# Patient Record
Sex: FEMALE | Race: WHITE | NOT HISPANIC OR LATINO | URBAN - METROPOLITAN AREA
[De-identification: names, ages, dates, MRNs, and addresses within clinical notes are randomized per-mention and may not be internally consistent; named-entity substitution may affect disease eponyms.]

---

## 2017-11-30 ENCOUNTER — OUTPATIENT (OUTPATIENT)
Dept: OUTPATIENT SERVICES | Facility: HOSPITAL | Age: 62
LOS: 1 days | End: 2017-11-30
Payer: COMMERCIAL

## 2017-11-30 ENCOUNTER — TRANSCRIPTION ENCOUNTER (OUTPATIENT)
Age: 62
End: 2017-11-30

## 2017-11-30 ENCOUNTER — RESULT REVIEW (OUTPATIENT)
Age: 62
End: 2017-11-30

## 2017-11-30 DIAGNOSIS — Z90.81 ACQUIRED ABSENCE OF SPLEEN: Chronic | ICD-10-CM

## 2017-11-30 DIAGNOSIS — Z86.010 PERSONAL HISTORY OF COLONIC POLYPS: ICD-10-CM

## 2017-11-30 DIAGNOSIS — K21.0 GASTRO-ESOPHAGEAL REFLUX DISEASE WITH ESOPHAGITIS: ICD-10-CM

## 2017-11-30 DIAGNOSIS — Z90.710 ACQUIRED ABSENCE OF BOTH CERVIX AND UTERUS: Chronic | ICD-10-CM

## 2017-11-30 DIAGNOSIS — R13.10 DYSPHAGIA, UNSPECIFIED: ICD-10-CM

## 2017-11-30 PROCEDURE — 88305 TISSUE EXAM BY PATHOLOGIST: CPT | Mod: 26

## 2017-11-30 PROCEDURE — 88312 SPECIAL STAINS GROUP 1: CPT | Mod: 26

## 2017-11-30 PROCEDURE — 43239 EGD BIOPSY SINGLE/MULTIPLE: CPT

## 2017-11-30 PROCEDURE — 88305 TISSUE EXAM BY PATHOLOGIST: CPT

## 2017-11-30 PROCEDURE — 88312 SPECIAL STAINS GROUP 1: CPT

## 2017-11-30 PROCEDURE — G0105: CPT

## 2017-12-01 LAB — SURGICAL PATHOLOGY FINAL REPORT - CH: SIGNIFICANT CHANGE UP

## 2018-11-28 ENCOUNTER — TRANSCRIPTION ENCOUNTER (OUTPATIENT)
Age: 63
End: 2018-11-28

## 2018-11-29 ENCOUNTER — RESULT REVIEW (OUTPATIENT)
Age: 63
End: 2018-11-29

## 2018-11-29 ENCOUNTER — OUTPATIENT (OUTPATIENT)
Dept: OUTPATIENT SERVICES | Facility: HOSPITAL | Age: 63
LOS: 1 days | End: 2018-11-29
Payer: COMMERCIAL

## 2018-11-29 DIAGNOSIS — Z90.710 ACQUIRED ABSENCE OF BOTH CERVIX AND UTERUS: Chronic | ICD-10-CM

## 2018-11-29 DIAGNOSIS — R13.10 DYSPHAGIA, UNSPECIFIED: ICD-10-CM

## 2018-11-29 DIAGNOSIS — Z90.81 ACQUIRED ABSENCE OF SPLEEN: Chronic | ICD-10-CM

## 2018-11-29 DIAGNOSIS — K21.0 GASTRO-ESOPHAGEAL REFLUX DISEASE WITH ESOPHAGITIS: ICD-10-CM

## 2018-11-29 PROCEDURE — 88313 SPECIAL STAINS GROUP 2: CPT

## 2018-11-29 PROCEDURE — 88305 TISSUE EXAM BY PATHOLOGIST: CPT | Mod: 26

## 2018-11-29 PROCEDURE — 88312 SPECIAL STAINS GROUP 1: CPT

## 2018-11-29 PROCEDURE — 88305 TISSUE EXAM BY PATHOLOGIST: CPT

## 2018-11-29 PROCEDURE — 88313 SPECIAL STAINS GROUP 2: CPT | Mod: 26

## 2018-11-29 PROCEDURE — 43239 EGD BIOPSY SINGLE/MULTIPLE: CPT

## 2018-11-29 PROCEDURE — 88312 SPECIAL STAINS GROUP 1: CPT | Mod: 26

## 2018-11-30 LAB — SURGICAL PATHOLOGY FINAL REPORT - CH: SIGNIFICANT CHANGE UP

## 2019-09-30 ENCOUNTER — OUTPATIENT (OUTPATIENT)
Dept: OUTPATIENT SERVICES | Facility: HOSPITAL | Age: 64
LOS: 1 days | End: 2019-09-30
Payer: MEDICARE

## 2019-09-30 VITALS
DIASTOLIC BLOOD PRESSURE: 76 MMHG | WEIGHT: 238.1 LBS | RESPIRATION RATE: 14 BRPM | TEMPERATURE: 98 F | HEIGHT: 61 IN | SYSTOLIC BLOOD PRESSURE: 115 MMHG | HEART RATE: 101 BPM

## 2019-09-30 DIAGNOSIS — Z98.890 OTHER SPECIFIED POSTPROCEDURAL STATES: Chronic | ICD-10-CM

## 2019-09-30 DIAGNOSIS — M19.90 UNSPECIFIED OSTEOARTHRITIS, UNSPECIFIED SITE: ICD-10-CM

## 2019-09-30 DIAGNOSIS — Z90.81 ACQUIRED ABSENCE OF SPLEEN: Chronic | ICD-10-CM

## 2019-09-30 DIAGNOSIS — Z90.710 ACQUIRED ABSENCE OF BOTH CERVIX AND UTERUS: Chronic | ICD-10-CM

## 2019-09-30 DIAGNOSIS — M19.072 PRIMARY OSTEOARTHRITIS, LEFT ANKLE AND FOOT: ICD-10-CM

## 2019-09-30 DIAGNOSIS — Z01.818 ENCOUNTER FOR OTHER PREPROCEDURAL EXAMINATION: ICD-10-CM

## 2019-09-30 LAB
ANION GAP SERPL CALC-SCNC: 7 MMOL/L — SIGNIFICANT CHANGE UP (ref 5–17)
BUN SERPL-MCNC: 27 MG/DL — HIGH (ref 7–23)
CALCIUM SERPL-MCNC: 9.9 MG/DL — SIGNIFICANT CHANGE UP (ref 8.4–10.5)
CHLORIDE SERPL-SCNC: 101 MMOL/L — SIGNIFICANT CHANGE UP (ref 96–108)
CO2 SERPL-SCNC: 30 MMOL/L — SIGNIFICANT CHANGE UP (ref 22–31)
CREAT SERPL-MCNC: 1.31 MG/DL — HIGH (ref 0.5–1.3)
GLUCOSE SERPL-MCNC: 135 MG/DL — HIGH (ref 70–99)
HBA1C BLD-MCNC: 7.4 % — HIGH (ref 4–5.6)
HCT VFR BLD CALC: 39.7 % — SIGNIFICANT CHANGE UP (ref 34.5–45)
HGB BLD-MCNC: 11.9 G/DL — SIGNIFICANT CHANGE UP (ref 11.5–15.5)
MCHC RBC-ENTMCNC: 26 PG — LOW (ref 27–34)
MCHC RBC-ENTMCNC: 30 GM/DL — LOW (ref 32–36)
MCV RBC AUTO: 86.7 FL — SIGNIFICANT CHANGE UP (ref 80–100)
NRBC # BLD: 0 /100 WBCS — SIGNIFICANT CHANGE UP (ref 0–0)
PLATELET # BLD AUTO: 537 K/UL — HIGH (ref 150–400)
POTASSIUM SERPL-MCNC: 4.3 MMOL/L — SIGNIFICANT CHANGE UP (ref 3.5–5.3)
POTASSIUM SERPL-SCNC: 4.3 MMOL/L — SIGNIFICANT CHANGE UP (ref 3.5–5.3)
RBC # BLD: 4.58 M/UL — SIGNIFICANT CHANGE UP (ref 3.8–5.2)
RBC # FLD: 18.7 % — HIGH (ref 10.3–14.5)
SODIUM SERPL-SCNC: 138 MMOL/L — SIGNIFICANT CHANGE UP (ref 135–145)
TSH SERPL-MCNC: 2.52 UIU/ML — SIGNIFICANT CHANGE UP (ref 0.27–4.2)
WBC # BLD: 11.44 K/UL — HIGH (ref 3.8–10.5)
WBC # FLD AUTO: 11.44 K/UL — HIGH (ref 3.8–10.5)

## 2019-09-30 PROCEDURE — 85027 COMPLETE CBC AUTOMATED: CPT

## 2019-09-30 PROCEDURE — 36415 COLL VENOUS BLD VENIPUNCTURE: CPT

## 2019-09-30 PROCEDURE — 80048 BASIC METABOLIC PNL TOTAL CA: CPT

## 2019-09-30 PROCEDURE — G0463: CPT

## 2019-09-30 PROCEDURE — 93005 ELECTROCARDIOGRAM TRACING: CPT

## 2019-09-30 PROCEDURE — 93010 ELECTROCARDIOGRAM REPORT: CPT

## 2019-09-30 PROCEDURE — 84443 ASSAY THYROID STIM HORMONE: CPT

## 2019-09-30 PROCEDURE — 83036 HEMOGLOBIN GLYCOSYLATED A1C: CPT

## 2019-09-30 NOTE — H&P PST ADULT - NSICDXPROBLEM_GEN_ALL_CORE_FT
PROBLEM DIAGNOSES  Problem: Arthritis  Assessment and Plan: Left triple  arthrodesis achilles tendon lengthening   Medical and cardiac clearance   Pre op instructions

## 2019-09-30 NOTE — H&P PST ADULT - NSICDXFAMILYHX_GEN_ALL_CORE_FT
FAMILY HISTORY:  FH: HTN (hypertension), sibilings    Father  Still living? No  Family history of cancer, Age at diagnosis: Age Unknown    Mother  Still living? No  Family history of diabetes mellitus, Age at diagnosis: Age Unknown

## 2019-09-30 NOTE — H&P PST ADULT - MUSCULOSKELETAL COMMENTS
left ankle swelling/pain decreased ROM: tender on palpation sciatica pain x1 month radiates to right leg- currently having PT, Left ankle pain/swelling

## 2019-09-30 NOTE — H&P PST ADULT - HISTORY OF PRESENT ILLNESS
63 yo female presents with progressively worsening left ankle pain/swelling for past 5 years related to torn ligament after fall injury 50 years ago without intervention. Pt reports aching intermittent left ankle with pain scale of 10/10 and decreased ROM. Report pain is affecting quality of life and activities. Use of injections/Aleve and brace without effect. Scheduled for left triple arthrodesis.

## 2019-09-30 NOTE — H&P PST ADULT - MUSCULOSKELETAL
detailed exam decreased ROM due to pain/diminished strength/joint swelling/no calf tenderness details…

## 2019-09-30 NOTE — H&P PST ADULT - NSICDXPASTMEDICALHX_GEN_ALL_CORE_FT
PAST MEDICAL HISTORY:  DM (diabetes mellitus), type 2     Dyslipidemia     H/O: Hypothyroidism s/p thyroidectomy 2008    Hodgkin's Disease 1987 treated with radiation 9 weeks    HTN (Hypertension)     Iron deficiency anemia Iron tablets 2 x day started 1 month ago    MVP (mitral valve prolapse)     platus fascitis     Thyroid cancer s/p thyroidectomy 2006

## 2019-09-30 NOTE — H&P PST ADULT - NSANTHOSAYNRD_GEN_A_CORE
No. NICOLAS screening performed.  STOP BANG Legend: 0-2 = LOW Risk; 3-4 = INTERMEDIATE Risk; 5-8 = HIGH Risk

## 2019-09-30 NOTE — H&P PST ADULT - NSICDXPASTSURGICALHX_GEN_ALL_CORE_FT
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

## 2019-10-17 ENCOUNTER — TRANSCRIPTION ENCOUNTER (OUTPATIENT)
Age: 64
End: 2019-10-17

## 2019-10-18 ENCOUNTER — RESULT REVIEW (OUTPATIENT)
Age: 64
End: 2019-10-18

## 2019-10-18 ENCOUNTER — INPATIENT (INPATIENT)
Facility: HOSPITAL | Age: 64
LOS: 2 days | Discharge: NURSING, REHAB OR LONG-TERM CARE | DRG: 494 | End: 2019-10-21
Attending: ORTHOPAEDIC SURGERY | Admitting: ORTHOPAEDIC SURGERY
Payer: MEDICARE

## 2019-10-18 VITALS
HEART RATE: 106 BPM | SYSTOLIC BLOOD PRESSURE: 163 MMHG | WEIGHT: 236.78 LBS | OXYGEN SATURATION: 98 % | DIASTOLIC BLOOD PRESSURE: 86 MMHG | TEMPERATURE: 98 F | RESPIRATION RATE: 16 BRPM | HEIGHT: 61 IN

## 2019-10-18 DIAGNOSIS — Z90.81 ACQUIRED ABSENCE OF SPLEEN: Chronic | ICD-10-CM

## 2019-10-18 DIAGNOSIS — M19.072 PRIMARY OSTEOARTHRITIS, LEFT ANKLE AND FOOT: ICD-10-CM

## 2019-10-18 DIAGNOSIS — Z98.890 OTHER SPECIFIED POSTPROCEDURAL STATES: Chronic | ICD-10-CM

## 2019-10-18 DIAGNOSIS — Z90.710 ACQUIRED ABSENCE OF BOTH CERVIX AND UTERUS: Chronic | ICD-10-CM

## 2019-10-18 LAB
ANION GAP SERPL CALC-SCNC: 8 MMOL/L — SIGNIFICANT CHANGE UP (ref 5–17)
BASOPHILS # BLD AUTO: 0.04 K/UL — SIGNIFICANT CHANGE UP (ref 0–0.2)
BASOPHILS NFR BLD AUTO: 0.2 % — SIGNIFICANT CHANGE UP (ref 0–2)
BUN SERPL-MCNC: 26 MG/DL — HIGH (ref 7–23)
CALCIUM SERPL-MCNC: 8.9 MG/DL — SIGNIFICANT CHANGE UP (ref 8.4–10.5)
CHLORIDE SERPL-SCNC: 101 MMOL/L — SIGNIFICANT CHANGE UP (ref 96–108)
CO2 SERPL-SCNC: 26 MMOL/L — SIGNIFICANT CHANGE UP (ref 22–31)
CREAT SERPL-MCNC: 1.19 MG/DL — SIGNIFICANT CHANGE UP (ref 0.5–1.3)
EOSINOPHIL # BLD AUTO: 0 K/UL — SIGNIFICANT CHANGE UP (ref 0–0.5)
EOSINOPHIL NFR BLD AUTO: 0 % — SIGNIFICANT CHANGE UP (ref 0–6)
GLUCOSE SERPL-MCNC: 209 MG/DL — HIGH (ref 70–99)
HCT VFR BLD CALC: 35.3 % — SIGNIFICANT CHANGE UP (ref 34.5–45)
HGB BLD-MCNC: 10.7 G/DL — LOW (ref 11.5–15.5)
IMM GRANULOCYTES NFR BLD AUTO: 0.4 % — SIGNIFICANT CHANGE UP (ref 0–1.5)
LYMPHOCYTES # BLD AUTO: 0.89 K/UL — LOW (ref 1–3.3)
LYMPHOCYTES # BLD AUTO: 4.8 % — LOW (ref 13–44)
MCHC RBC-ENTMCNC: 26.2 PG — LOW (ref 27–34)
MCHC RBC-ENTMCNC: 30.3 GM/DL — LOW (ref 32–36)
MCV RBC AUTO: 86.5 FL — SIGNIFICANT CHANGE UP (ref 80–100)
MONOCYTES # BLD AUTO: 0.69 K/UL — SIGNIFICANT CHANGE UP (ref 0–0.9)
MONOCYTES NFR BLD AUTO: 3.7 % — SIGNIFICANT CHANGE UP (ref 2–14)
NEUTROPHILS # BLD AUTO: 16.89 K/UL — HIGH (ref 1.8–7.4)
NEUTROPHILS NFR BLD AUTO: 90.9 % — HIGH (ref 43–77)
NRBC # BLD: 0 /100 WBCS — SIGNIFICANT CHANGE UP (ref 0–0)
PLATELET # BLD AUTO: 461 K/UL — HIGH (ref 150–400)
POTASSIUM SERPL-MCNC: 4.4 MMOL/L — SIGNIFICANT CHANGE UP (ref 3.5–5.3)
POTASSIUM SERPL-SCNC: 4.4 MMOL/L — SIGNIFICANT CHANGE UP (ref 3.5–5.3)
RBC # BLD: 4.08 M/UL — SIGNIFICANT CHANGE UP (ref 3.8–5.2)
RBC # FLD: 18.1 % — HIGH (ref 10.3–14.5)
SODIUM SERPL-SCNC: 135 MMOL/L — SIGNIFICANT CHANGE UP (ref 135–145)
WBC # BLD: 18.59 K/UL — HIGH (ref 3.8–10.5)
WBC # FLD AUTO: 18.59 K/UL — HIGH (ref 3.8–10.5)

## 2019-10-18 PROCEDURE — 88304 TISSUE EXAM BY PATHOLOGIST: CPT | Mod: 26

## 2019-10-18 PROCEDURE — 99223 1ST HOSP IP/OBS HIGH 75: CPT

## 2019-10-18 RX ORDER — CEFAZOLIN SODIUM 1 G
2000 VIAL (EA) INJECTION ONCE
Refills: 0 | Status: DISCONTINUED | OUTPATIENT
Start: 2019-10-18 | End: 2019-10-21

## 2019-10-18 RX ORDER — LEVOTHYROXINE SODIUM 125 MCG
125 TABLET ORAL DAILY
Refills: 0 | Status: DISCONTINUED | OUTPATIENT
Start: 2019-10-18 | End: 2019-10-21

## 2019-10-18 RX ORDER — ONDANSETRON 8 MG/1
4 TABLET, FILM COATED ORAL EVERY 6 HOURS
Refills: 0 | Status: DISCONTINUED | OUTPATIENT
Start: 2019-10-18 | End: 2019-10-21

## 2019-10-18 RX ORDER — LEVOTHYROXINE SODIUM 125 MCG
1 TABLET ORAL
Qty: 0 | Refills: 0 | DISCHARGE

## 2019-10-18 RX ORDER — VALSARTAN 80 MG/1
1 TABLET ORAL
Qty: 0 | Refills: 0 | DISCHARGE

## 2019-10-18 RX ORDER — METFORMIN HYDROCHLORIDE 850 MG/1
500 TABLET ORAL
Refills: 0 | Status: DISCONTINUED | OUTPATIENT
Start: 2019-10-19 | End: 2019-10-21

## 2019-10-18 RX ORDER — ACETAMINOPHEN 500 MG
1000 TABLET ORAL EVERY 8 HOURS
Refills: 0 | Status: DISCONTINUED | OUTPATIENT
Start: 2019-10-19 | End: 2019-10-21

## 2019-10-18 RX ORDER — OXYCODONE HYDROCHLORIDE 5 MG/1
5 TABLET ORAL ONCE
Refills: 0 | Status: DISCONTINUED | OUTPATIENT
Start: 2019-10-18 | End: 2019-10-18

## 2019-10-18 RX ORDER — ATORVASTATIN CALCIUM 80 MG/1
20 TABLET, FILM COATED ORAL AT BEDTIME
Refills: 0 | Status: DISCONTINUED | OUTPATIENT
Start: 2019-10-18 | End: 2019-10-21

## 2019-10-18 RX ORDER — INSULIN LISPRO 100/ML
VIAL (ML) SUBCUTANEOUS
Refills: 0 | Status: DISCONTINUED | OUTPATIENT
Start: 2019-10-18 | End: 2019-10-21

## 2019-10-18 RX ORDER — DEXTROSE 50 % IN WATER 50 %
25 SYRINGE (ML) INTRAVENOUS ONCE
Refills: 0 | Status: DISCONTINUED | OUTPATIENT
Start: 2019-10-18 | End: 2019-10-21

## 2019-10-18 RX ORDER — HYDROMORPHONE HYDROCHLORIDE 2 MG/ML
0.5 INJECTION INTRAMUSCULAR; INTRAVENOUS; SUBCUTANEOUS
Refills: 0 | Status: DISCONTINUED | OUTPATIENT
Start: 2019-10-18 | End: 2019-10-18

## 2019-10-18 RX ORDER — FERROUS SULFATE 325(65) MG
1 TABLET ORAL
Qty: 0 | Refills: 0 | DISCHARGE

## 2019-10-18 RX ORDER — SODIUM CHLORIDE 9 MG/ML
1000 INJECTION, SOLUTION INTRAVENOUS
Refills: 0 | Status: DISCONTINUED | OUTPATIENT
Start: 2019-10-18 | End: 2019-10-20

## 2019-10-18 RX ORDER — SODIUM CHLORIDE 9 MG/ML
1000 INJECTION, SOLUTION INTRAVENOUS
Refills: 0 | Status: DISCONTINUED | OUTPATIENT
Start: 2019-10-18 | End: 2019-10-18

## 2019-10-18 RX ORDER — DOCUSATE SODIUM 100 MG
100 CAPSULE ORAL THREE TIMES A DAY
Refills: 0 | Status: DISCONTINUED | OUTPATIENT
Start: 2019-10-18 | End: 2019-10-21

## 2019-10-18 RX ORDER — ASPIRIN/CALCIUM CARB/MAGNESIUM 324 MG
325 TABLET ORAL DAILY
Refills: 0 | Status: DISCONTINUED | OUTPATIENT
Start: 2019-10-18 | End: 2019-10-21

## 2019-10-18 RX ORDER — GLIMEPIRIDE 1 MG
0 TABLET ORAL
Qty: 0 | Refills: 0 | DISCHARGE

## 2019-10-18 RX ORDER — OXYCODONE HYDROCHLORIDE 5 MG/1
10 TABLET ORAL EVERY 4 HOURS
Refills: 0 | Status: DISCONTINUED | OUTPATIENT
Start: 2019-10-18 | End: 2019-10-19

## 2019-10-18 RX ORDER — DEXTROSE 50 % IN WATER 50 %
15 SYRINGE (ML) INTRAVENOUS ONCE
Refills: 0 | Status: DISCONTINUED | OUTPATIENT
Start: 2019-10-18 | End: 2019-10-21

## 2019-10-18 RX ORDER — CHLORHEXIDINE GLUCONATE 213 G/1000ML
1 SOLUTION TOPICAL ONCE
Refills: 0 | Status: COMPLETED | OUTPATIENT
Start: 2019-10-18 | End: 2019-10-18

## 2019-10-18 RX ORDER — HYDROMORPHONE HYDROCHLORIDE 2 MG/ML
0.5 INJECTION INTRAMUSCULAR; INTRAVENOUS; SUBCUTANEOUS EVERY 4 HOURS
Refills: 0 | Status: DISCONTINUED | OUTPATIENT
Start: 2019-10-18 | End: 2019-10-21

## 2019-10-18 RX ORDER — GLUCAGON INJECTION, SOLUTION 0.5 MG/.1ML
1 INJECTION, SOLUTION SUBCUTANEOUS ONCE
Refills: 0 | Status: DISCONTINUED | OUTPATIENT
Start: 2019-10-18 | End: 2019-10-21

## 2019-10-18 RX ORDER — INSULIN LISPRO 100/ML
VIAL (ML) SUBCUTANEOUS AT BEDTIME
Refills: 0 | Status: DISCONTINUED | OUTPATIENT
Start: 2019-10-18 | End: 2019-10-21

## 2019-10-18 RX ORDER — ATORVASTATIN CALCIUM 80 MG/1
1 TABLET, FILM COATED ORAL
Qty: 0 | Refills: 0 | DISCHARGE

## 2019-10-18 RX ORDER — DEXTROSE 50 % IN WATER 50 %
12.5 SYRINGE (ML) INTRAVENOUS ONCE
Refills: 0 | Status: DISCONTINUED | OUTPATIENT
Start: 2019-10-18 | End: 2019-10-21

## 2019-10-18 RX ORDER — ACETAMINOPHEN 500 MG
1000 TABLET ORAL EVERY 6 HOURS
Refills: 0 | Status: COMPLETED | OUTPATIENT
Start: 2019-10-18 | End: 2019-10-19

## 2019-10-18 RX ORDER — OXYCODONE HYDROCHLORIDE 5 MG/1
5 TABLET ORAL EVERY 4 HOURS
Refills: 0 | Status: DISCONTINUED | OUTPATIENT
Start: 2019-10-18 | End: 2019-10-19

## 2019-10-18 RX ORDER — METOPROLOL TARTRATE 50 MG
25 TABLET ORAL DAILY
Refills: 0 | Status: DISCONTINUED | OUTPATIENT
Start: 2019-10-18 | End: 2019-10-21

## 2019-10-18 RX ORDER — SODIUM CHLORIDE 9 MG/ML
1000 INJECTION, SOLUTION INTRAVENOUS
Refills: 0 | Status: DISCONTINUED | OUTPATIENT
Start: 2019-10-18 | End: 2019-10-21

## 2019-10-18 RX ORDER — CEFAZOLIN SODIUM 1 G
2000 VIAL (EA) INJECTION EVERY 8 HOURS
Refills: 0 | Status: COMPLETED | OUTPATIENT
Start: 2019-10-18 | End: 2019-10-19

## 2019-10-18 RX ORDER — VALSARTAN 80 MG/1
320 TABLET ORAL DAILY
Refills: 0 | Status: DISCONTINUED | OUTPATIENT
Start: 2019-10-20 | End: 2019-10-21

## 2019-10-18 RX ORDER — PANTOPRAZOLE SODIUM 20 MG/1
40 TABLET, DELAYED RELEASE ORAL
Refills: 0 | Status: DISCONTINUED | OUTPATIENT
Start: 2019-10-18 | End: 2019-10-21

## 2019-10-18 RX ORDER — ONDANSETRON 8 MG/1
4 TABLET, FILM COATED ORAL ONCE
Refills: 0 | Status: DISCONTINUED | OUTPATIENT
Start: 2019-10-18 | End: 2019-10-18

## 2019-10-18 RX ADMIN — CHLORHEXIDINE GLUCONATE 1 APPLICATION(S): 213 SOLUTION TOPICAL at 08:47

## 2019-10-18 RX ADMIN — Medication 100 MILLIGRAM(S): at 22:19

## 2019-10-18 RX ADMIN — SODIUM CHLORIDE 100 MILLILITER(S): 9 INJECTION, SOLUTION INTRAVENOUS at 14:35

## 2019-10-18 RX ADMIN — Medication 1000 MILLIGRAM(S): at 19:47

## 2019-10-18 RX ADMIN — Medication 400 MILLIGRAM(S): at 19:43

## 2019-10-18 RX ADMIN — ATORVASTATIN CALCIUM 20 MILLIGRAM(S): 80 TABLET, FILM COATED ORAL at 22:18

## 2019-10-18 RX ADMIN — Medication 1: at 17:33

## 2019-10-18 RX ADMIN — SODIUM CHLORIDE 125 MILLILITER(S): 9 INJECTION, SOLUTION INTRAVENOUS at 17:36

## 2019-10-18 NOTE — CONSULT NOTE ADULT - ASSESSMENT
POD#0 s/p LEFT Ankle Triple Arthrodesis with achilles lengthening  - Pain control  - Bowel regimen  - PT/OT  - VTE PPx - per Ortho    DM Type 2  - hold glimeperide  - resume metformin  - use sliding scale AC and HS    HTN  - continue metoprolol  - resume ARB on POD#@    HLD  - continue statin    Thyroid Ca s/p thyroidectomy  - continue levothyroxine POD#0 s/p LEFT Ankle Triple Arthrodesis with achilles lengthening  - Pain control  - Bowel regimen  - PT/OT  - VTE PPx - ASA 325mg x 6 weeks, then resume 81mg daily    Hx of CAD (cath in 2011, medically managed, related to prior radiation tx)  - continue Aspirin at higher dose for VTE PPx, then usual dose after 6 weeks  - continue beta-blocker, statin    DM Type 2  - hold glimeperide  - resume metformin  - use sliding scale AC and HS    HTN  - continue metoprolol  - resume ARB on POD#2    HLD  - continue statin    Thyroid Ca s/p thyroidectomy  - continue levothyroxine

## 2019-10-18 NOTE — CONSULT NOTE ADULT - SUBJECTIVE AND OBJECTIVE BOX
Information Obtained from:  EHR, Physical Chart, Patient at bedside (relevant EHR and Chart information verified with patient)    HPI:  65yo F admitted s/p triple arthrodesis with achilles tendon lengthening today.  Had progressively worsening left ankle pain/swelling for the past 5 years to torn ligament (suffered during fall approx 50ya).  Pain 10/10 with activity, decreased ROM, minimal relief with Aleve and brace.    REVIEW OF SYSTEMS:  CONSTITUTIONAL: No fever, weight loss, or fatigue  EYES: No eye pain, visual disturbances, or discharge  ENMT:  No difficulty hearing, tinnitus, vertigo; No sinus or throat pain  NECK: No pain or stiffness  BREASTS: No pain, masses, or nipple discharge  RESPIRATORY: No cough, wheezing, chills or hemoptysis; No shortness of breath  CARDIOVASCULAR: No chest pain, palpitations, dizziness, or leg swelling  GASTROINTESTINAL: No abdominal or epigastric pain. No nausea, vomiting, or hematemesis; No diarrhea or constipation. No melena or hematochezia.  GENITOURINARY: No dysuria, frequency, hematuria, or incontinence  NEUROLOGICAL: No headaches, memory loss, loss of strength, numbness, or tremors  SKIN: No itching, burning, rashes, or lesions   LYMPH NODES: No enlarged glands  ENDOCRINE: No heat or cold intolerance; No hair loss  MUSCULOSKELETAL: No muscle or back pain  PSYCHIATRIC: No depression, anxiety, mood swings, or difficulty sleeping  HEME/LYMPH: No easy bruising, or bleeding gums  ALLERGY AND IMMUNOLOGIC: No hives or eczema    PAST MEDICAL & SURGICAL HISTORY:  Thyroid cancer: s/p thyroidectomy 2006  Iron deficiency anemia: Iron tablets 2 x day started 1 month ago  MVP (mitral valve prolapse)  DM (diabetes mellitus), type 2  platus fascitis  Hodgkin's Disease: 1987 treated with radiation 9 weeks  HTN (Hypertension)  H/O: Hypothyroidism: s/p thyroidectomy 2008  Dyslipidemia  H/O hernia repair: hiatal 2016  S/P hysterectomy: 2012  H/O splenectomy: 1987- Sienna&#x27;s  S/P D&C: x5 for polyps  History of Thyroidectomy: 2006 d/t thyroid cancer- total removal    SOCIAL HISTORY:  Lives: spouse  Smoking Hx: none  ETOH Hx: none  Illicit Drug Use:  None    Allergies    latex (Rash)  No Known Drug Allergies    Intolerances     Home Medications:  Aspir 81 81 mg oral tablet: 1 tab(s) orally once a day (18 Oct 2019 08:05)  atorvastatin 20 mg oral tablet: 1 tab(s) orally once a day (18 Oct 2019 08:05)  ferrous sulfate 325 mg (65 mg elemental iron) oral tablet: 1 tab(s) orally 2 times a day (18 Oct 2019 08:05)  glimepiride 1 mg oral tablet: orally 2 times a day (18 Oct 2019 08:05)  levothyroxine 125 mcg (0.125 mg) oral tablet: 1 tab(s) orally once a day (18 Oct 2019 08:05)  metFORMIN 500 mg oral tablet:  orally 2 times a day (18 Oct 2019 08:05)  Metoprolol Succinate ER 25 mg oral tablet, extended release: 1 tab(s) orally once a day (18 Oct 2019 08:05)  pantoprazole 40 mg oral delayed release tablet: 1 tab(s) orally once a day (18 Oct 2019 08:05)  valsartan 320 mg oral tablet: 1 tab(s) orally once a day (18 Oct 2019 08:05)  vitamin D: 1000 unit(s)  once a day (18 Oct 2019 08:05)    FAMILY HISTORY:  FH: HTN (hypertension): sibilings  Family history of diabetes mellitus (Mother)  Family history of cancer (Father): kidney tumor    PHYSICAL EXAM:  Vital Signs Last 24 Hrs  T(C): 37.6 (18 Oct 2019 14:09), Max: 37.6 (18 Oct 2019 14:09)  T(F): 99.6 (18 Oct 2019 14:09), Max: 99.6 (18 Oct 2019 14:09)  HR: 104 (18 Oct 2019 14:54) (98 - 108)  BP: 125/63 (18 Oct 2019 14:54) (125/62 - 163/86)  BP(mean): --  RR: 20 (18 Oct 2019 14:54) (16 - 23)  SpO2: 93% (18 Oct 2019 14:54) (93% - 98%)    GENERAL: NAD, well-groomed, well-developed, awake, alert, oriented x 3, fluent and coherent speech  EYES: EOMI, PERRLA, conjunctiva and sclera clear  ENMT: No tonsillar erythema, exudates, or enlargement; Moist mucous membranes, Good dentition, No lesions  NECK: Supple, No JVD, No Cervical LAD, No thyromegaly, No thyroid nodules  NERVOUS SYSTEM:  Good concentration;  No facial droop  CHEST/LUNG: Clear to auscultation bilaterally; No rales, rhonchi, wheezing, or rubs  HEART: Regular rate and rhythm; No murmurs, rubs, or gallops  ABDOMEN: Soft, Nontender, Nondistended, Bowel sounds present, No palpable masses or organomegaly, No bruits  EXTREMITIES:  2+ Peripheral Pulses, No clubbing, cyanosis, or edema  INCISION:  Dressing clean/dry/intact Information Obtained from:  EHR, Physical Chart, Patient at bedside (relevant EHR and Chart information verified with patient)    HPI:  65yo F admitted s/p triple arthrodesis with achilles tendon lengthening today.  Had progressively worsening left ankle pain/swelling for the past 5 years to torn ligament (suffered during fall approx 50ya).  Pain 10/10 with activity, decreased ROM, minimal relief with Aleve and brace.    REVIEW OF SYSTEMS:  CONSTITUTIONAL: No fever, weight loss, or fatigue  EYES: No eye pain, visual disturbances, or discharge  ENMT:  No difficulty hearing, tinnitus, vertigo; No sinus or throat pain  NECK: No pain or stiffness  BREASTS: No pain, masses, or nipple discharge  RESPIRATORY: No cough, wheezing, chills or hemoptysis; No shortness of breath  CARDIOVASCULAR: No chest pain, palpitations, dizziness, or leg swelling  GASTROINTESTINAL: No abdominal or epigastric pain. No nausea, vomiting, or hematemesis; No diarrhea or constipation. No melena or hematochezia.  GENITOURINARY: No dysuria, frequency, hematuria, or incontinence  NEUROLOGICAL: No headaches, memory loss, loss of strength, numbness, or tremors  SKIN: No itching, burning, rashes, or lesions   LYMPH NODES: No enlarged glands  ENDOCRINE: No heat or cold intolerance; No hair loss  MUSCULOSKELETAL: No muscle or back pain  PSYCHIATRIC: No depression, anxiety, mood swings, or difficulty sleeping  HEME/LYMPH: No easy bruising, or bleeding gums  ALLERGY AND IMMUNOLOGIC: No hives or eczema    PAST MEDICAL & SURGICAL HISTORY:  Thyroid cancer: s/p thyroidectomy 2006  Iron deficiency anemia: Iron tablets 2 x day started 1 month ago  MVP (mitral valve prolapse)  DM (diabetes mellitus), type 2  platus fascitis  Hodgkin's Disease: 1987 treated with radiation 9 weeks  HTN (Hypertension)  H/O: Hypothyroidism: s/p thyroidectomy 2008  Dyslipidemia  H/O hernia repair: hiatal 2016  S/P hysterectomy: 2012  H/O splenectomy: 1987- Sienna&#x27;s  S/P D&C: x5 for polyps  History of Thyroidectomy: 2006 d/t thyroid cancer- total removal    SOCIAL HISTORY:  Lives: spouse  Smoking Hx: none  ETOH Hx: none  Illicit Drug Use:  None    Allergies    latex (Rash)  No Known Drug Allergies    Intolerances     Home Medications:  Aspir 81 81 mg oral tablet: 1 tab(s) orally once a day (18 Oct 2019 08:05)  atorvastatin 20 mg oral tablet: 1 tab(s) orally once a day (18 Oct 2019 08:05)  ferrous sulfate 325 mg (65 mg elemental iron) oral tablet: 1 tab(s) orally 2 times a day (18 Oct 2019 08:05)  glimepiride 1 mg oral tablet: orally 2 times a day (18 Oct 2019 08:05)  levothyroxine 125 mcg (0.125 mg) oral tablet: 1 tab(s) orally once a day (18 Oct 2019 08:05)  metFORMIN 500 mg oral tablet:  orally 2 times a day (18 Oct 2019 08:05)  Metoprolol Succinate ER 25 mg oral tablet, extended release: 1 tab(s) orally once a day (18 Oct 2019 08:05)  pantoprazole 40 mg oral delayed release tablet: 1 tab(s) orally once a day (18 Oct 2019 08:05)  valsartan 320 mg oral tablet: 1 tab(s) orally once a day (18 Oct 2019 08:05)  vitamin D: 1000 unit(s)  once a day (18 Oct 2019 08:05)    FAMILY HISTORY:  FH: HTN (hypertension): sibilings  Family history of diabetes mellitus (Mother)  Family history of cancer (Father): kidney tumor    PHYSICAL EXAM:  Vital Signs Last 24 Hrs  T(C): 37.6 (18 Oct 2019 14:09), Max: 37.6 (18 Oct 2019 14:09)  T(F): 99.6 (18 Oct 2019 14:09), Max: 99.6 (18 Oct 2019 14:09)  HR: 104 (18 Oct 2019 14:54) (98 - 108)  BP: 125/63 (18 Oct 2019 14:54) (125/62 - 163/86)  BP(mean): --  RR: 20 (18 Oct 2019 14:54) (16 - 23)  SpO2: 93% (18 Oct 2019 14:54) (93% - 98%)    GENERAL: NAD, well-groomed, well-developed, awake, alert, oriented x 3, fluent and coherent speech  EYES: EOMI, PERRLA, conjunctiva and sclera clear  ENMT: No tonsillar erythema, exudates, or enlargement; Moist mucous membranes, Good dentition, No lesions  NECK: Supple, No JVD, No Cervical LAD, No thyromegaly, No thyroid nodules  NERVOUS SYSTEM:  Good concentration;  No facial droop  CHEST/LUNG: Clear to auscultation bilaterally; No rales, rhonchi, wheezing, or rubs  HEART: Regular rate and rhythm; + systolic murmur LSB  ABDOMEN: Soft, Nontender, Nondistended, Bowel sounds present, No palpable masses or organomegaly, No bruits  EXTREMITIES:  2+ Peripheral Pulses, No clubbing, cyanosis, or edema  INCISION:  Dressing clean/dry/intact

## 2019-10-19 ENCOUNTER — TRANSCRIPTION ENCOUNTER (OUTPATIENT)
Age: 64
End: 2019-10-19

## 2019-10-19 LAB
ANION GAP SERPL CALC-SCNC: 7 MMOL/L — SIGNIFICANT CHANGE UP (ref 5–17)
BUN SERPL-MCNC: 21 MG/DL — SIGNIFICANT CHANGE UP (ref 7–23)
CALCIUM SERPL-MCNC: 8.7 MG/DL — SIGNIFICANT CHANGE UP (ref 8.4–10.5)
CHLORIDE SERPL-SCNC: 103 MMOL/L — SIGNIFICANT CHANGE UP (ref 96–108)
CO2 SERPL-SCNC: 27 MMOL/L — SIGNIFICANT CHANGE UP (ref 22–31)
CREAT SERPL-MCNC: 0.93 MG/DL — SIGNIFICANT CHANGE UP (ref 0.5–1.3)
GLUCOSE SERPL-MCNC: 180 MG/DL — HIGH (ref 70–99)
HCT VFR BLD CALC: 32 % — LOW (ref 34.5–45)
HGB BLD-MCNC: 9.6 G/DL — LOW (ref 11.5–15.5)
MCHC RBC-ENTMCNC: 25.9 PG — LOW (ref 27–34)
MCHC RBC-ENTMCNC: 30 GM/DL — LOW (ref 32–36)
MCV RBC AUTO: 86.5 FL — SIGNIFICANT CHANGE UP (ref 80–100)
NRBC # BLD: 0 /100 WBCS — SIGNIFICANT CHANGE UP (ref 0–0)
PLATELET # BLD AUTO: 415 K/UL — HIGH (ref 150–400)
POTASSIUM SERPL-MCNC: 4.6 MMOL/L — SIGNIFICANT CHANGE UP (ref 3.5–5.3)
POTASSIUM SERPL-SCNC: 4.6 MMOL/L — SIGNIFICANT CHANGE UP (ref 3.5–5.3)
RBC # BLD: 3.7 M/UL — LOW (ref 3.8–5.2)
RBC # FLD: 17.9 % — HIGH (ref 10.3–14.5)
SODIUM SERPL-SCNC: 137 MMOL/L — SIGNIFICANT CHANGE UP (ref 135–145)
WBC # BLD: 16.12 K/UL — HIGH (ref 3.8–10.5)
WBC # FLD AUTO: 16.12 K/UL — HIGH (ref 3.8–10.5)

## 2019-10-19 PROCEDURE — 99233 SBSQ HOSP IP/OBS HIGH 50: CPT

## 2019-10-19 RX ORDER — ACETAMINOPHEN 500 MG
2 TABLET ORAL
Qty: 0 | Refills: 0 | DISCHARGE
Start: 2019-10-19

## 2019-10-19 RX ORDER — HYDROMORPHONE HYDROCHLORIDE 2 MG/ML
1 INJECTION INTRAMUSCULAR; INTRAVENOUS; SUBCUTANEOUS
Qty: 0 | Refills: 0 | DISCHARGE
Start: 2019-10-19

## 2019-10-19 RX ORDER — PANTOPRAZOLE SODIUM 20 MG/1
1 TABLET, DELAYED RELEASE ORAL
Qty: 0 | Refills: 0 | DISCHARGE
Start: 2019-10-19

## 2019-10-19 RX ORDER — SENNA PLUS 8.6 MG/1
2 TABLET ORAL AT BEDTIME
Refills: 0 | Status: DISCONTINUED | OUTPATIENT
Start: 2019-10-19 | End: 2019-10-21

## 2019-10-19 RX ORDER — HYDROMORPHONE HYDROCHLORIDE 2 MG/ML
2 INJECTION INTRAMUSCULAR; INTRAVENOUS; SUBCUTANEOUS
Refills: 0 | Status: DISCONTINUED | OUTPATIENT
Start: 2019-10-19 | End: 2019-10-21

## 2019-10-19 RX ORDER — DOCUSATE SODIUM 100 MG
1 CAPSULE ORAL
Qty: 0 | Refills: 0 | DISCHARGE
Start: 2019-10-19

## 2019-10-19 RX ORDER — SENNA PLUS 8.6 MG/1
2 TABLET ORAL
Qty: 0 | Refills: 0 | DISCHARGE
Start: 2019-10-19

## 2019-10-19 RX ORDER — HYDROMORPHONE HYDROCHLORIDE 2 MG/ML
4 INJECTION INTRAMUSCULAR; INTRAVENOUS; SUBCUTANEOUS
Refills: 0 | Status: DISCONTINUED | OUTPATIENT
Start: 2019-10-19 | End: 2019-10-21

## 2019-10-19 RX ADMIN — Medication 25 MILLIGRAM(S): at 05:21

## 2019-10-19 RX ADMIN — HYDROMORPHONE HYDROCHLORIDE 2 MILLIGRAM(S): 2 INJECTION INTRAMUSCULAR; INTRAVENOUS; SUBCUTANEOUS at 23:01

## 2019-10-19 RX ADMIN — ATORVASTATIN CALCIUM 20 MILLIGRAM(S): 80 TABLET, FILM COATED ORAL at 21:55

## 2019-10-19 RX ADMIN — Medication 400 MILLIGRAM(S): at 07:37

## 2019-10-19 RX ADMIN — Medication 1000 MILLIGRAM(S): at 08:22

## 2019-10-19 RX ADMIN — Medication 1000 MILLIGRAM(S): at 21:55

## 2019-10-19 RX ADMIN — Medication 400 MILLIGRAM(S): at 01:53

## 2019-10-19 RX ADMIN — Medication 100 MILLIGRAM(S): at 05:22

## 2019-10-19 RX ADMIN — Medication 1: at 17:26

## 2019-10-19 RX ADMIN — Medication 100 MILLIGRAM(S): at 21:56

## 2019-10-19 RX ADMIN — Medication 125 MICROGRAM(S): at 05:21

## 2019-10-19 RX ADMIN — Medication 1000 MILLIGRAM(S): at 21:57

## 2019-10-19 RX ADMIN — PANTOPRAZOLE SODIUM 40 MILLIGRAM(S): 20 TABLET, DELAYED RELEASE ORAL at 05:21

## 2019-10-19 RX ADMIN — Medication 325 MILLIGRAM(S): at 12:39

## 2019-10-19 RX ADMIN — Medication 1: at 08:29

## 2019-10-19 RX ADMIN — HYDROMORPHONE HYDROCHLORIDE 2 MILLIGRAM(S): 2 INJECTION INTRAMUSCULAR; INTRAVENOUS; SUBCUTANEOUS at 18:50

## 2019-10-19 RX ADMIN — Medication 1000 MILLIGRAM(S): at 12:39

## 2019-10-19 RX ADMIN — HYDROMORPHONE HYDROCHLORIDE 2 MILLIGRAM(S): 2 INJECTION INTRAMUSCULAR; INTRAVENOUS; SUBCUTANEOUS at 22:02

## 2019-10-19 RX ADMIN — METFORMIN HYDROCHLORIDE 500 MILLIGRAM(S): 850 TABLET ORAL at 17:26

## 2019-10-19 RX ADMIN — HYDROMORPHONE HYDROCHLORIDE 2 MILLIGRAM(S): 2 INJECTION INTRAMUSCULAR; INTRAVENOUS; SUBCUTANEOUS at 10:47

## 2019-10-19 RX ADMIN — Medication 1: at 12:38

## 2019-10-19 RX ADMIN — Medication 1000 MILLIGRAM(S): at 01:53

## 2019-10-19 RX ADMIN — SODIUM CHLORIDE 125 MILLILITER(S): 9 INJECTION, SOLUTION INTRAVENOUS at 05:22

## 2019-10-19 RX ADMIN — HYDROMORPHONE HYDROCHLORIDE 2 MILLIGRAM(S): 2 INJECTION INTRAMUSCULAR; INTRAVENOUS; SUBCUTANEOUS at 19:29

## 2019-10-19 RX ADMIN — METFORMIN HYDROCHLORIDE 500 MILLIGRAM(S): 850 TABLET ORAL at 08:30

## 2019-10-19 RX ADMIN — HYDROMORPHONE HYDROCHLORIDE 2 MILLIGRAM(S): 2 INJECTION INTRAMUSCULAR; INTRAVENOUS; SUBCUTANEOUS at 10:04

## 2019-10-19 NOTE — DISCHARGE NOTE PROVIDER - CARE PROVIDER_API CALL
Jase Jewell)  Orthopaedic Surgery  [address] [place] [address]  Phone: [phone]  Fax: [phone]  Follow Up Time:

## 2019-10-19 NOTE — DISCHARGE NOTE PROVIDER - NSDCCPCAREPLAN_GEN_ALL_CORE_FT
PRINCIPAL DISCHARGE DIAGNOSIS  Diagnosis: Arthritis  Assessment and Plan of Treatment: left foot PRINCIPAL DISCHARGE DIAGNOSIS  Diagnosis: Arthritis  Assessment and Plan of Treatment: Pain managemet as per ortho  FUll dose asa for 4 weeks   baby asa thereafter      SECONDARY DISCHARGE DIAGNOSES  Diagnosis: Diabetes  Assessment and Plan of Treatment: resume home dose of metformin and glimeperide PRINCIPAL DISCHARGE DIAGNOSIS  Diagnosis: Arthritis  Assessment and Plan of Treatment: Pain managemet as per ortho  FUll dose asa for 4 weeks   baby asa thereafter      SECONDARY DISCHARGE DIAGNOSES  Diagnosis: CAD in native artery  Assessment and Plan of Treatment: full dose asa as per ortho for duration.  baby asa thereafter  continue with metoprolol and statin    Diagnosis: Diabetes  Assessment and Plan of Treatment: resume home dose of metformin and glimeperide

## 2019-10-19 NOTE — DISCHARGE NOTE PROVIDER - HOSPITAL COURSE
This 63y/o female patient was admitted to [facility] on 10/18/19 with a history of severe degenerative joint disease of the left foot and admitted for an elective triple arthrodesis.  Patient had appropriate preop medical evaluation and testing.    Patient received antibiotics according to SCIP guidelines for infection prevention.  Ecotrin 325 daily  was given for DVT prophylaxis.  Anesthesia, Medical Hospitalist, Physical Therapy and Occupational Therapy were consulted.  Patient is stable for discharge with a good prognosis to [facility]. Appropriate discharge instructions and medications are provided in this document.

## 2019-10-19 NOTE — OCCUPATIONAL THERAPY INITIAL EVALUATION ADULT - ADDITIONAL COMMENTS
Lives alone in a condo. has 5 steps to enter with HR only on 3. Stall shower. No DME, Pt will not have assist

## 2019-10-19 NOTE — PHYSICAL THERAPY INITIAL EVALUATION ADULT - ADDITIONAL COMMENTS
Pt lives alone in a condo with 1 curb then 5 [address]FI[address] (only partial HR present), no steps inside. Pt owns a cane but denies use. Pt does not have anyone to assist at home, daugter and sister both work.

## 2019-10-19 NOTE — PROGRESS NOTE ADULT - SUBJECTIVE AND OBJECTIVE BOX
POST OPERATIVE DAY #:  [ 1]   STATUS POST: [ ] Left triple arthrodesis foot                    Patient is a 64y old  Female who presents with a chief complaint of left ankle pain (18 Oct 2019 15:17)      Pain well controlled    OBJECTIVE:     Vital Signs Last 24 Hrs  T(C): 36.6 (19 Oct 2019 03:09), Max: 37.6 (18 Oct 2019 14:09)  T(F): 97.8 (19 Oct 2019 03:09), Max: 99.6 (18 Oct 2019 14:09)  HR: 103 (19 Oct 2019 05:15) (94 - 110)  BP: 132/78 (19 Oct 2019 05:15) (101/60 - 163/86)  BP(mean): --  RR: 15 (19 Oct 2019 03:09) (13 - 28)  SpO2: 94% (19 Oct 2019 03:09) (93% - 100%)    Affected extremity:          Dressing: and splint  clean/dry/intact           Sensation; intact to light touch            Motor exam: Toes warm and mobile well perfused;  +capillary refill         No calf tenderness bilateral LE's    LABS:                        10.7   18.59 )-----------( 461      ( 18 Oct 2019 18:24 )             35.3     10-18    135  |  101  |  26<H>  ----------------------------<  209<H>  4.4   |  26  |  1.19    Ca    8.9      18 Oct 2019 18:24              A/P :    -    Analgesics PRN  -    DVT ppx: [ x]ASA 81 bid [ ] Lovenox [ ] Coumadin   [ ] Eliquis   -    Check AM labs  -    Weight bearing status: WBAT [ ]        PWB    [ ]     TTWB  [ ]      NWB  [x ]  -    Physical Therapy  -    Occupational Therapy  -    Dispo: Home [ ]     Rehab [ ]      DWIGHT [ ]      To be determined [x ]

## 2019-10-19 NOTE — DISCHARGE NOTE PROVIDER - INSTRUCTIONS
For Constipation :   • Increase your water intake. Drink at least 8 glasses of water daily.  • Try adding fiber to your diet by eating fruits, vegetables and foods that are rich in grains.  • If you do experience constipation, you may take an over-the-counter stool softener/laxative such as Dawn Colace, Senekot or  Milk of Magnesia.

## 2019-10-19 NOTE — PROGRESS NOTE ADULT - SUBJECTIVE AND OBJECTIVE BOX
Subjective:     MEDICATIONS  (STANDING):  acetaminophen   Tablet .. 1000 milliGRAM(s) Oral every 8 hours  aspirin enteric coated 325 milliGRAM(s) Oral daily  atorvastatin 20 milliGRAM(s) Oral at bedtime  ceFAZolin   IVPB 2000 milliGRAM(s) IV Intermittent once  dextrose 5%. 1000 milliLiter(s) (50 mL/Hr) IV Continuous <Continuous>  dextrose 50% Injectable 12.5 Gram(s) IV Push once  dextrose 50% Injectable 25 Gram(s) IV Push once  dextrose 50% Injectable 25 Gram(s) IV Push once  docusate sodium 100 milliGRAM(s) Oral three times a day  insulin lispro (HumaLOG) corrective regimen sliding scale   SubCutaneous at bedtime  insulin lispro (HumaLOG) corrective regimen sliding scale   SubCutaneous three times a day before meals  lactated ringers. 1000 milliLiter(s) (125 mL/Hr) IV Continuous <Continuous>  levothyroxine 125 MICROGram(s) Oral daily  metFORMIN 500 milliGRAM(s) Oral two times a day  metoprolol succinate ER 25 milliGRAM(s) Oral daily  pantoprazole    Tablet 40 milliGRAM(s) Oral before breakfast    MEDICATIONS  (PRN):  aluminum hydroxide/magnesium hydroxide/simethicone Suspension 30 milliLiter(s) Oral four times a day PRN Indigestion  dextrose 40% Gel 15 Gram(s) Oral once PRN Blood Glucose LESS THAN 70 milliGRAM(s)/deciliter  glucagon  Injectable 1 milliGRAM(s) IntraMuscular once PRN Glucose LESS THAN 70 milligrams/deciliter  HYDROmorphone   Tablet 2 milliGRAM(s) Oral every 3 hours PRN Mild Pain (1 - 3)  HYDROmorphone   Tablet 4 milliGRAM(s) Oral every 3 hours PRN Moderate Pain (4 - 6)  HYDROmorphone  Injectable 0.5 milliGRAM(s) IV Push every 4 hours PRN Severe Pain (7 - 10)  ondansetron Injectable 4 milliGRAM(s) IV Push every 6 hours PRN Nausea and/or Vomiting      Allergies    latex (Rash)  No Known Drug Allergies    Intolerances        Vital Signs Last 24 Hrs  T(C): 36.4 (19 Oct 2019 07:17), Max: 37.6 (18 Oct 2019 14:09)  T(F): 97.6 (19 Oct 2019 07:17), Max: 99.6 (18 Oct 2019 14:09)  HR: 108 (19 Oct 2019 07:17) (94 - 110)  BP: 100/65 (19 Oct 2019 07:17) (100/65 - 132/78)  BP(mean): --  RR: 18 (19 Oct 2019 07:17) (13 - 28)  SpO2: 93% (19 Oct 2019 07:17) (93% - 100%)    PHYSICAL EXAM:  GENERAL: NAD, well-groomed, well-developed  HEAD:  Atraumatic, Normocephalic  ENMT: Moist mucous membranes,   NECK: Supple, No JVD, Normal thyroid  NERVOUS SYSTEM:  All 4 extremities mobile, no gross sensory deficits.   CHEST/LUNG: Clear to auscultation bilaterally; No rales, rhonchi, wheezing, or rubs  HEART: Regular rate and rhythm; No murmurs, rubs, or gallops  ABDOMEN: Soft, Nontender, Nondistended; Bowel sounds present  EXTREMITIES:  2+ Peripheral Pulses, No clubbing, cyanosis, or edema      LABS:                        9.6    16.12 )-----------( 415      ( 19 Oct 2019 07:21 )             32.0     19 Oct 2019 07:21    137    |  103    |  21     ----------------------------<  180    4.6     |  27     |  0.93     Ca    8.7        19 Oct 2019 07:21          CAPILLARY BLOOD GLUCOSE      POCT Blood Glucose.: 167 mg/dL (19 Oct 2019 08:04)  POCT Blood Glucose.: 187 mg/dL (18 Oct 2019 22:22)  POCT Blood Glucose.: 189 mg/dL (18 Oct 2019 17:08)  POCT Blood Glucose.: 179 mg/dL (18 Oct 2019 14:13)      RADIOLOGY & ADDITIONAL TESTS:    Imaging Personally Reviewed:  [ ] YES     Consultant(s) Notes Reviewed:      Care Discussed with Consultants/Other Providers:    Advanced Directives: [ ] DNR  [ ] No feeding tube  [ ] MOLST in chart  [ ] MOLST completed today  [ ] Unknown Subjective: : Doing well with no complaints.     MEDICATIONS  (STANDING):  acetaminophen   Tablet .. 1000 milliGRAM(s) Oral every 8 hours  aspirin enteric coated 325 milliGRAM(s) Oral daily  atorvastatin 20 milliGRAM(s) Oral at bedtime  ceFAZolin   IVPB 2000 milliGRAM(s) IV Intermittent once  dextrose 5%. 1000 milliLiter(s) (50 mL/Hr) IV Continuous <Continuous>  dextrose 50% Injectable 12.5 Gram(s) IV Push once  dextrose 50% Injectable 25 Gram(s) IV Push once  dextrose 50% Injectable 25 Gram(s) IV Push once  docusate sodium 100 milliGRAM(s) Oral three times a day  insulin lispro (HumaLOG) corrective regimen sliding scale   SubCutaneous at bedtime  insulin lispro (HumaLOG) corrective regimen sliding scale   SubCutaneous three times a day before meals  lactated ringers. 1000 milliLiter(s) (125 mL/Hr) IV Continuous <Continuous>  levothyroxine 125 MICROGram(s) Oral daily  metFORMIN 500 milliGRAM(s) Oral two times a day  metoprolol succinate ER 25 milliGRAM(s) Oral daily  pantoprazole    Tablet 40 milliGRAM(s) Oral before breakfast    MEDICATIONS  (PRN):  aluminum hydroxide/magnesium hydroxide/simethicone Suspension 30 milliLiter(s) Oral four times a day PRN Indigestion  dextrose 40% Gel 15 Gram(s) Oral once PRN Blood Glucose LESS THAN 70 milliGRAM(s)/deciliter  glucagon  Injectable 1 milliGRAM(s) IntraMuscular once PRN Glucose LESS THAN 70 milligrams/deciliter  HYDROmorphone   Tablet 2 milliGRAM(s) Oral every 3 hours PRN Mild Pain (1 - 3)  HYDROmorphone   Tablet 4 milliGRAM(s) Oral every 3 hours PRN Moderate Pain (4 - 6)  HYDROmorphone  Injectable 0.5 milliGRAM(s) IV Push every 4 hours PRN Severe Pain (7 - 10)  ondansetron Injectable 4 milliGRAM(s) IV Push every 6 hours PRN Nausea and/or Vomiting      Allergies    latex (Rash)  No Known Drug Allergies    Intolerances        Vital Signs Last 24 Hrs  T(C): 36.4 (19 Oct 2019 07:17), Max: 37.6 (18 Oct 2019 14:09)  T(F): 97.6 (19 Oct 2019 07:17), Max: 99.6 (18 Oct 2019 14:09)  HR: 108 (19 Oct 2019 07:17) (94 - 110)  BP: 100/65 (19 Oct 2019 07:17) (100/65 - 132/78)  BP(mean): --  RR: 18 (19 Oct 2019 07:17) (13 - 28)  SpO2: 93% (19 Oct 2019 07:17) (93% - 100%)    PHYSICAL EXAM:  GENERAL: NAD, well-groomed, well-developed  HEAD:  Atraumatic, Normocephalic  ENMT: Moist mucous membranes,   NECK: Supple, No JVD, Normal thyroid  NERVOUS SYSTEM:  All 4 extremities mobile, no gross sensory deficits.   CHEST/LUNG: Clear to auscultation bilaterally; No rales, rhonchi, wheezing, or rubs  HEART: Regular rate and rhythm; No murmurs, rubs, or gallops  ABDOMEN: Soft, Nontender, Nondistended; Bowel sounds present  EXTREMITIES:  2+ Peripheral Pulses, No clubbing, cyanosis, or edema      LABS:                        9.6    16.12 )-----------( 415      ( 19 Oct 2019 07:21 )             32.0     19 Oct 2019 07:21    137    |  103    |  21     ----------------------------<  180    4.6     |  27     |  0.93     Ca    8.7        19 Oct 2019 07:21          CAPILLARY BLOOD GLUCOSE      POCT Blood Glucose.: 167 mg/dL (19 Oct 2019 08:04)  POCT Blood Glucose.: 187 mg/dL (18 Oct 2019 22:22)  POCT Blood Glucose.: 189 mg/dL (18 Oct 2019 17:08)  POCT Blood Glucose.: 179 mg/dL (18 Oct 2019 14:13)      RADIOLOGY & ADDITIONAL TESTS:    Imaging Personally Reviewed:  [ ] YES     Consultant(s) Notes Reviewed:      Care Discussed with Consultants/Other Providers:    Advanced Directives: [ ] DNR  [ ] No feeding tube  [ ] MOLST in chart  [ ] MOLST completed today  [ ] Unknown

## 2019-10-20 LAB
ANION GAP SERPL CALC-SCNC: 6 MMOL/L — SIGNIFICANT CHANGE UP (ref 5–17)
BUN SERPL-MCNC: 23 MG/DL — SIGNIFICANT CHANGE UP (ref 7–23)
CALCIUM SERPL-MCNC: 8.5 MG/DL — SIGNIFICANT CHANGE UP (ref 8.4–10.5)
CHLORIDE SERPL-SCNC: 101 MMOL/L — SIGNIFICANT CHANGE UP (ref 96–108)
CO2 SERPL-SCNC: 28 MMOL/L — SIGNIFICANT CHANGE UP (ref 22–31)
CREAT SERPL-MCNC: 1.25 MG/DL — SIGNIFICANT CHANGE UP (ref 0.5–1.3)
GLUCOSE SERPL-MCNC: 164 MG/DL — HIGH (ref 70–99)
HCT VFR BLD CALC: 32.2 % — LOW (ref 34.5–45)
HGB BLD-MCNC: 9.6 G/DL — LOW (ref 11.5–15.5)
MCHC RBC-ENTMCNC: 26.3 PG — LOW (ref 27–34)
MCHC RBC-ENTMCNC: 29.8 GM/DL — LOW (ref 32–36)
MCV RBC AUTO: 88.2 FL — SIGNIFICANT CHANGE UP (ref 80–100)
NRBC # BLD: 0 /100 WBCS — SIGNIFICANT CHANGE UP (ref 0–0)
PLATELET # BLD AUTO: 395 K/UL — SIGNIFICANT CHANGE UP (ref 150–400)
POTASSIUM SERPL-MCNC: 3.7 MMOL/L — SIGNIFICANT CHANGE UP (ref 3.5–5.3)
POTASSIUM SERPL-SCNC: 3.7 MMOL/L — SIGNIFICANT CHANGE UP (ref 3.5–5.3)
RBC # BLD: 3.65 M/UL — LOW (ref 3.8–5.2)
RBC # FLD: 18.5 % — HIGH (ref 10.3–14.5)
SODIUM SERPL-SCNC: 135 MMOL/L — SIGNIFICANT CHANGE UP (ref 135–145)
WBC # BLD: 14.44 K/UL — HIGH (ref 3.8–10.5)
WBC # FLD AUTO: 14.44 K/UL — HIGH (ref 3.8–10.5)

## 2019-10-20 PROCEDURE — 99233 SBSQ HOSP IP/OBS HIGH 50: CPT

## 2019-10-20 RX ORDER — SODIUM CHLORIDE 9 MG/ML
1000 INJECTION INTRAMUSCULAR; INTRAVENOUS; SUBCUTANEOUS ONCE
Refills: 0 | Status: COMPLETED | OUTPATIENT
Start: 2019-10-20 | End: 2019-10-20

## 2019-10-20 RX ADMIN — Medication 1000 MILLIGRAM(S): at 06:09

## 2019-10-20 RX ADMIN — Medication 25 MILLIGRAM(S): at 06:08

## 2019-10-20 RX ADMIN — VALSARTAN 320 MILLIGRAM(S): 80 TABLET ORAL at 06:09

## 2019-10-20 RX ADMIN — Medication 325 MILLIGRAM(S): at 11:26

## 2019-10-20 RX ADMIN — ATORVASTATIN CALCIUM 20 MILLIGRAM(S): 80 TABLET, FILM COATED ORAL at 21:46

## 2019-10-20 RX ADMIN — SODIUM CHLORIDE 1000 MILLILITER(S): 9 INJECTION INTRAMUSCULAR; INTRAVENOUS; SUBCUTANEOUS at 04:41

## 2019-10-20 RX ADMIN — PANTOPRAZOLE SODIUM 40 MILLIGRAM(S): 20 TABLET, DELAYED RELEASE ORAL at 06:08

## 2019-10-20 RX ADMIN — METFORMIN HYDROCHLORIDE 500 MILLIGRAM(S): 850 TABLET ORAL at 18:21

## 2019-10-20 RX ADMIN — Medication 1000 MILLIGRAM(S): at 06:08

## 2019-10-20 RX ADMIN — Medication 125 MICROGRAM(S): at 06:08

## 2019-10-20 RX ADMIN — Medication 1000 MILLIGRAM(S): at 21:47

## 2019-10-20 RX ADMIN — Medication 1: at 07:41

## 2019-10-20 RX ADMIN — Medication 1000 MILLIGRAM(S): at 21:45

## 2019-10-20 RX ADMIN — METFORMIN HYDROCHLORIDE 500 MILLIGRAM(S): 850 TABLET ORAL at 07:40

## 2019-10-20 NOTE — PROGRESS NOTE ADULT - SUBJECTIVE AND OBJECTIVE BOX
ORTHOPEDIC PA PROGRESS NOTE  THALIA GOLDBERG      64y Female                                 SY 2WST 223 [id number]                                                                                                                           POD #    2d    STATUS POST:       Procedure: Triple arthrodesis: left, achillers tendon lengthening             Patient seen and examined at bedside.      Current Pain Management:    acetaminophen   Tablet .. 1000 milliGRAM(s) Oral every 8 hours  HYDROmorphone   Tablet 2 milliGRAM(s) Oral every 3 hours PRN  HYDROmorphone   Tablet 4 milliGRAM(s) Oral every 3 hours PRN  HYDROmorphone  Injectable 0.5 milliGRAM(s) IV Push every 4 hours PRN  ondansetron Injectable 4 milliGRAM(s) IV Push every 6 hours PRN      T(F): 98.4  HR: 92  BP: 81/54  RR: 14  SpO2: 99%                         9.6    14.44 )-----------( 395      ( 20 Oct 2019 06:48 )             32.2         10-20    135  |  101  |  23  ----------------------------<  164<H>  3.7   |  28  |  1.25    Ca    8.5      20 Oct 2019 06:48      10-19-19 @ 07:01  -  10-20-19 @ 07:00  --------------------------------------------------------  IN:  Total IN: 0 mL    OUT:    Voided: 350 mL  Total OUT: 350 mL    Total NET: -350 mL        Physical Exam :    -   Dressing C/D/I.   -   Distal Neurvascular status intact grossly.   -   Warm well perfused; capillary refill <3 seconds   -   (+)EHL/FHL   -   (+) Sensation to light touch  -   (-) Calf tenderness Bilaterally      A/P: 64y Female s/p Triple arthrodesis: left, achillers tendon lengthening     -   Ortho Stable  -   Pain control:  acetaminophen   Tablet .. 1000 milliGRAM(s) Oral every 8 hours  HYDROmorphone   Tablet 2 milliGRAM(s) Oral every 3 hours PRN  HYDROmorphone   Tablet 4 milliGRAM(s) Oral every 3 hours PRN  HYDROmorphone  Injectable 0.5 milliGRAM(s) IV Push every 4 hours PRN  ondansetron Injectable 4 milliGRAM(s) IV Push every 6 hours PRN    -   Medicine to follow  -   DVT ppx:    PAS +  aspirin enteric coated: 325 milliGRAM(s) Oral    -   PT/OT OOB,  Weight bearing status: NWB RLE   -  Dispo:  DWIGHT

## 2019-10-20 NOTE — PROGRESS NOTE ADULT - SUBJECTIVE AND OBJECTIVE BOX
Subjective: No complaints and no overnight events     MEDICATIONS  (STANDING):  acetaminophen   Tablet .. 1000 milliGRAM(s) Oral every 8 hours  aspirin enteric coated 325 milliGRAM(s) Oral daily  atorvastatin 20 milliGRAM(s) Oral at bedtime  ceFAZolin   IVPB 2000 milliGRAM(s) IV Intermittent once  dextrose 5%. 1000 milliLiter(s) (50 mL/Hr) IV Continuous <Continuous>  dextrose 50% Injectable 12.5 Gram(s) IV Push once  dextrose 50% Injectable 25 Gram(s) IV Push once  dextrose 50% Injectable 25 Gram(s) IV Push once  docusate sodium 100 milliGRAM(s) Oral three times a day  insulin lispro (HumaLOG) corrective regimen sliding scale   SubCutaneous at bedtime  insulin lispro (HumaLOG) corrective regimen sliding scale   SubCutaneous three times a day before meals  lactated ringers. 1000 milliLiter(s) (125 mL/Hr) IV Continuous <Continuous>  levothyroxine 125 MICROGram(s) Oral daily  metFORMIN 500 milliGRAM(s) Oral two times a day  metoprolol succinate ER 25 milliGRAM(s) Oral daily  pantoprazole    Tablet 40 milliGRAM(s) Oral before breakfast  senna 2 Tablet(s) Oral at bedtime  valsartan 320 milliGRAM(s) Oral daily    MEDICATIONS  (PRN):  aluminum hydroxide/magnesium hydroxide/simethicone Suspension 30 milliLiter(s) Oral four times a day PRN Indigestion  dextrose 40% Gel 15 Gram(s) Oral once PRN Blood Glucose LESS THAN 70 milliGRAM(s)/deciliter  glucagon  Injectable 1 milliGRAM(s) IntraMuscular once PRN Glucose LESS THAN 70 milligrams/deciliter  HYDROmorphone   Tablet 2 milliGRAM(s) Oral every 3 hours PRN Mild Pain (1 - 3)  HYDROmorphone   Tablet 4 milliGRAM(s) Oral every 3 hours PRN Moderate Pain (4 - 6)  HYDROmorphone  Injectable 0.5 milliGRAM(s) IV Push every 4 hours PRN Severe Pain (7 - 10)  ondansetron Injectable 4 milliGRAM(s) IV Push every 6 hours PRN Nausea and/or Vomiting      Allergies    latex (Rash)  No Known Drug Allergies    Intolerances        Vital Signs Last 24 Hrs  T(C): 36.9 (20 Oct 2019 08:27), Max: 37 (20 Oct 2019 03:00)  T(F): 98.4 (20 Oct 2019 08:27), Max: 98.6 (20 Oct 2019 03:00)  HR: 102 (20 Oct 2019 10:08) (92 - 118)  BP: 107/73 (20 Oct 2019 10:08) (81/54 - 128/64)  BP(mean): --  RR: 16 (20 Oct 2019 10:08) (14 - 17)  SpO2: 99% (20 Oct 2019 08:27) (93% - 99%)    PHYSICAL EXAM:  GENERAL: NAD, well-groomed, well-developed  HEAD:  Atraumatic, Normocephalic  ENMT: Moist mucous membranes,   NECK: Supple, No JVD, Normal thyroid  NERVOUS SYSTEM:  All 4 extremities mobile, no gross sensory deficits.   CHEST/LUNG: Clear to auscultation bilaterally; No rales, rhonchi, wheezing, or rubs  HEART: Regular rate and rhythm; No murmurs, rubs, or gallops  ABDOMEN: Soft, Nontender, Nondistended; Bowel sounds present  EXTREMITIES:  2+ Peripheral Pulses, No clubbing, cyanosis, or edema      LABS:                        9.6    14.44 )-----------( 395      ( 20 Oct 2019 06:48 )             32.2     20 Oct 2019 06:48    135    |  101    |  23     ----------------------------<  164    3.7     |  28     |  1.25     Ca    8.5        20 Oct 2019 06:48          CAPILLARY BLOOD GLUCOSE      POCT Blood Glucose.: 152 mg/dL (20 Oct 2019 07:37)  POCT Blood Glucose.: 154 mg/dL (19 Oct 2019 21:47)  POCT Blood Glucose.: 156 mg/dL (19 Oct 2019 17:04)  POCT Blood Glucose.: 162 mg/dL (19 Oct 2019 12:11)      RADIOLOGY & ADDITIONAL TESTS:    Imaging Personally Reviewed:  [ ] YES     Consultant(s) Notes Reviewed:      Care Discussed with Consultants/Other Providers:    Advanced Directives: [ ] DNR  [ ] No feeding tube  [ ] MOLST in chart  [ ] MOLST completed today  [ ] Unknown

## 2019-10-21 ENCOUNTER — TRANSCRIPTION ENCOUNTER (OUTPATIENT)
Age: 64
End: 2019-10-21

## 2019-10-21 VITALS
SYSTOLIC BLOOD PRESSURE: 138 MMHG | OXYGEN SATURATION: 94 % | HEART RATE: 103 BPM | TEMPERATURE: 98 F | DIASTOLIC BLOOD PRESSURE: 75 MMHG | RESPIRATION RATE: 16 BRPM

## 2019-10-21 LAB
ANION GAP SERPL CALC-SCNC: 7 MMOL/L — SIGNIFICANT CHANGE UP (ref 5–17)
BUN SERPL-MCNC: 22 MG/DL — SIGNIFICANT CHANGE UP (ref 7–23)
CALCIUM SERPL-MCNC: 9.1 MG/DL — SIGNIFICANT CHANGE UP (ref 8.4–10.5)
CHLORIDE SERPL-SCNC: 101 MMOL/L — SIGNIFICANT CHANGE UP (ref 96–108)
CO2 SERPL-SCNC: 28 MMOL/L — SIGNIFICANT CHANGE UP (ref 22–31)
CREAT SERPL-MCNC: 1.01 MG/DL — SIGNIFICANT CHANGE UP (ref 0.5–1.3)
GLUCOSE SERPL-MCNC: 123 MG/DL — HIGH (ref 70–99)
HCT VFR BLD CALC: 33.5 % — LOW (ref 34.5–45)
HGB BLD-MCNC: 10.1 G/DL — LOW (ref 11.5–15.5)
MCHC RBC-ENTMCNC: 26.4 PG — LOW (ref 27–34)
MCHC RBC-ENTMCNC: 30.1 GM/DL — LOW (ref 32–36)
MCV RBC AUTO: 87.5 FL — SIGNIFICANT CHANGE UP (ref 80–100)
NRBC # BLD: 0 /100 WBCS — SIGNIFICANT CHANGE UP (ref 0–0)
PLATELET # BLD AUTO: 419 K/UL — HIGH (ref 150–400)
POTASSIUM SERPL-MCNC: 4.1 MMOL/L — SIGNIFICANT CHANGE UP (ref 3.5–5.3)
POTASSIUM SERPL-SCNC: 4.1 MMOL/L — SIGNIFICANT CHANGE UP (ref 3.5–5.3)
RBC # BLD: 3.83 M/UL — SIGNIFICANT CHANGE UP (ref 3.8–5.2)
RBC # FLD: 18.2 % — HIGH (ref 10.3–14.5)
SODIUM SERPL-SCNC: 136 MMOL/L — SIGNIFICANT CHANGE UP (ref 135–145)
WBC # BLD: 12.4 K/UL — HIGH (ref 3.8–10.5)
WBC # FLD AUTO: 12.4 K/UL — HIGH (ref 3.8–10.5)

## 2019-10-21 PROCEDURE — 99238 HOSP IP/OBS DSCHRG MGMT 30/<: CPT

## 2019-10-21 RX ORDER — ASPIRIN/CALCIUM CARB/MAGNESIUM 324 MG
1 TABLET ORAL
Qty: 0 | Refills: 0 | DISCHARGE
Start: 2019-10-21

## 2019-10-21 RX ORDER — PANTOPRAZOLE SODIUM 20 MG/1
1 TABLET, DELAYED RELEASE ORAL
Qty: 0 | Refills: 0 | DISCHARGE

## 2019-10-21 RX ADMIN — METFORMIN HYDROCHLORIDE 500 MILLIGRAM(S): 850 TABLET ORAL at 08:32

## 2019-10-21 RX ADMIN — VALSARTAN 320 MILLIGRAM(S): 80 TABLET ORAL at 05:31

## 2019-10-21 RX ADMIN — Medication 1000 MILLIGRAM(S): at 05:31

## 2019-10-21 RX ADMIN — Medication 325 MILLIGRAM(S): at 11:56

## 2019-10-21 RX ADMIN — PANTOPRAZOLE SODIUM 40 MILLIGRAM(S): 20 TABLET, DELAYED RELEASE ORAL at 05:31

## 2019-10-21 RX ADMIN — Medication 25 MILLIGRAM(S): at 05:31

## 2019-10-21 RX ADMIN — Medication 1000 MILLIGRAM(S): at 11:57

## 2019-10-21 RX ADMIN — Medication 1000 MILLIGRAM(S): at 05:32

## 2019-10-21 RX ADMIN — Medication 125 MICROGRAM(S): at 05:31

## 2019-10-21 NOTE — DISCHARGE NOTE NURSING/CASE MANAGEMENT/SOCIAL WORK - NSDPFAC_GEN_ALL_CORE
[facility] [phone] via [facility]  pickup 1pm

## 2019-10-21 NOTE — PROGRESS NOTE ADULT - ASSESSMENT
Neurovascular status in tact
POD#1 s/p LEFT Ankle Triple Arthrodesis with achilles lengthening  - Pain control  - Bowel regimen  - PT/OT  - VTE PPx - ASA 325mg x 6 weeks, then resume 81mg daily    Hx of CAD (cath in 2011, medically managed, related to prior radiation tx)  - continue Aspirin at higher dose for VTE PPx, then usual dose after 6 weeks  - continue beta-blocker, statin    Leukocytosis  - Afebrile and no clear indication of infection   - Also in the setting of Hodgkins Lymphoma history   - Most likely exaggerated reactive response and will continue to monitor for now    DM Type 2  - hold glimeperide  - resume metformin  - use sliding scale AC and HS  - BS labile but ok for now     HTN  - continue metoprolol  - resume ARB on POD#2    HLD  - continue statin    Thyroid Ca s/p thyroidectomy  - continue levothyroxine
POD#2 s/p LEFT Ankle Triple Arthrodesis with achilles lengthening  - Pain control  - Bowel regimen  - PT/OT  - VTE PPx - ASA 325mg x 6 weeks, then resume 81mg daily    Hx of CAD (cath in 2011, medically managed, related to prior radiation tx)  - continue Aspirin at higher dose for VTE PPx, then usual dose after 6 weeks  - continue beta-blocker, statin    Leukocytosis  - Afebrile and no clear indication of infection   - Also in the setting of Hodgkins Lymphoma history   - Most likely exaggerated reactive response and will continue to monitor for now    DM Type 2  - hold glimeperide  - resume metformin  - use sliding scale AC and HS  - BS labile but ok for now     HTN  - continue metoprolol  - resume ARB on POD#2    HLD  - continue statin    Thyroid Ca s/p thyroidectomy  - continue levothyroxine
POD#2 s/p LEFT Ankle Triple Arthrodesis with achilles lengthening  - Pain control  - Bowel regimen  - PT/OT  - VTE PPx - ASA 325mg x 6 weeks, then resume 81mg daily    Hx of CAD (cath in 2011, medically managed, related to prior radiation tx)  - continue Aspirin at higher dose for VTE PPx, then usual dose after 6 weeks  - continue beta-blocker, statin    Leukocytosis  - Afebrile and no clear indication of infection   - Also in the setting of Hodgkins Lymphoma history   - Most likely exaggerated reactive response and will continue to monitor for now    DM Type 2  - hold glimeperide  - resume metformin  - use sliding scale AC and HS  - BS labile but ok for now     HTN  - continue metoprolol  - resume ARB on POD#2    HLD  - continue statin    Thyroid Ca s/p thyroidectomy  - continue levothyroxine

## 2019-10-21 NOTE — PROGRESS NOTE ADULT - SUBJECTIVE AND OBJECTIVE BOX
patient seen and examined at bedside. doing well. for [place]    Review of Systems:  General:denies fever chills, headache, weakness  HEENT: denies blurry vision,diffculty swallowing,  Cardiovascular: denies chest pain  ,palpitatins  Pulmonary:denies shortness of breath, cough, wheezing  Gastrointestinal: denies abdominal pain, constipation, diarrhra  : denies hematuria, dysuria  Neurological: denies weakness, numbness , tingling, dizziness  skin: denies skin rash  Psychiatrical: denies mood disturbances          Objective:  Vitals  T(C): 36.5 (10-21-19 @ 07:36), Max: 37 (10-20-19 @ 15:43)  HR: 95 (10-21-19 @ 07:36) (95 - 109)  BP: 165/76 (10-21-19 @ 07:36) (114/74 - 165/76)  RR: 16 (10-21-19 @ 07:36) (15 - 16)  SpO2: 94% (10-21-19 @ 07:36) (92% - 95%)    Physical Exam:  General: comfortable, no acute distress, well nourished  HEENT: no LAD, trachea midline  Cardiovascular: normal s1s2, no mrg  Pulmonary: CTA Bilaterally, no wheezing , rhonchi  Gastrointestinal: soft non tender non distended, no masses felt  Muscloskeletal: no lower extremity edema  Neurological: CN II-12 intact. No focal weakness  Psychiatrical: normal mood, cooperative    Labs:                          10.1   12.40 )-----------( 419      ( 21 Oct 2019 07:19 )             33.5     10-21    136  |  101  |  22  ----------------------------<  123<H>  4.1   |  28  |  1.01    Ca    9.1      21 Oct 2019 07:19              Active Medications  MEDICATIONS  (STANDING):  acetaminophen   Tablet .. 1000 milliGRAM(s) Oral every 8 hours  aspirin enteric coated 325 milliGRAM(s) Oral daily  atorvastatin 20 milliGRAM(s) Oral at bedtime  ceFAZolin   IVPB 2000 milliGRAM(s) IV Intermittent once  dextrose 5%. 1000 milliLiter(s) (50 mL/Hr) IV Continuous <Continuous>  dextrose 50% Injectable 12.5 Gram(s) IV Push once  dextrose 50% Injectable 25 Gram(s) IV Push once  dextrose 50% Injectable 25 Gram(s) IV Push once  docusate sodium 100 milliGRAM(s) Oral three times a day  insulin lispro (HumaLOG) corrective regimen sliding scale   SubCutaneous at bedtime  insulin lispro (HumaLOG) corrective regimen sliding scale   SubCutaneous three times a day before meals  levothyroxine 125 MICROGram(s) Oral daily  metFORMIN 500 milliGRAM(s) Oral two times a day  metoprolol succinate ER 25 milliGRAM(s) Oral daily  pantoprazole    Tablet 40 milliGRAM(s) Oral before breakfast  senna 2 Tablet(s) Oral at bedtime  valsartan 320 milliGRAM(s) Oral daily    MEDICATIONS  (PRN):  aluminum hydroxide/magnesium hydroxide/simethicone Suspension 30 milliLiter(s) Oral four times a day PRN Indigestion  dextrose 40% Gel 15 Gram(s) Oral once PRN Blood Glucose LESS THAN 70 milliGRAM(s)/deciliter  glucagon  Injectable 1 milliGRAM(s) IntraMuscular once PRN Glucose LESS THAN 70 milligrams/deciliter  HYDROmorphone   Tablet 2 milliGRAM(s) Oral every 3 hours PRN Mild Pain (1 - 3)  HYDROmorphone   Tablet 4 milliGRAM(s) Oral every 3 hours PRN Moderate Pain (4 - 6)  HYDROmorphone  Injectable 0.5 milliGRAM(s) IV Push every 4 hours PRN Severe Pain (7 - 10)  ondansetron Injectable 4 milliGRAM(s) IV Push every 6 hours PRN Nausea and/or Vomiting

## 2019-10-21 NOTE — DISCHARGE NOTE NURSING/CASE MANAGEMENT/SOCIAL WORK - PATIENT PORTAL LINK FT
You can access the FollowMyHealth Patient Portal offered by [facility] by registering at the following website: [email or web address]. By joining [facility] FollowMyHealth portal, you will also be able to view your health information using other applications (apps) compatible with our system.

## 2019-10-21 NOTE — PROGRESS NOTE ADULT - SUBJECTIVE AND OBJECTIVE BOX
ORTHOPEDIC PA PROGRESS NOTE  THALIA GOLDBERG      64y Female                                                                                                                               POD #    3    STATUS POST:               Pre-Op Dx: Arthritis of ankle, left    Post-Op Dx:  Arthritis of ankle, left    Procedure: Triple arthrodesis: left, achillers tendon lengthening                                                Pain (0-10):   Current Pain Management:  [ ] PCA   [ ] Po Analgesics [ ] IM /IV Anagesics     T(F): 97.7  HR: 95  BP: 165/76  RR: 16  SpO2: 94%                        10.1   12.40 )-----------( 419      ( 21 Oct 2019 07:19 )             33.5                     10-21    136  |  101  |  22  ----------------------------<  123<H>  4.1   |  28  |  1.01    Ca    9.1      21 Oct 2019 07:19      Physical Exam :    -   Splint sterile  -   Distal Neurvascular status intact grossly.   -   Warm well perfused; capillary refill <3 seconds   -   (+)EHL/FHL 5/5  -   (+) Sensation to light touch  -   (-) Calf tenderness Bilaterally    A/P: 64y Female s/p Arthritis of ankle, left    -   Ortho Stable  -   Pain control   -   Medicine to follow  -   DVT ppx:     [x]SCDs     [ ] ASA     [ ] Eliquis     [ ] Lovenox  -   Weight bearing status:  WBAT [ ]        PWB    [ ]     TTWB  [ ]      NWB  [x]   -  Dispo:     Home [ ]     Acute Rehab [ ]     DWIGHT [x]     TBD [ ]

## 2019-11-12 PROCEDURE — 97110 THERAPEUTIC EXERCISES: CPT

## 2019-11-12 PROCEDURE — 88304 TISSUE EXAM BY PATHOLOGIST: CPT

## 2019-11-12 PROCEDURE — C1713: CPT

## 2019-11-12 PROCEDURE — 76000 FLUOROSCOPY <1 HR PHYS/QHP: CPT

## 2019-11-12 PROCEDURE — C1889: CPT

## 2019-11-12 PROCEDURE — 36415 COLL VENOUS BLD VENIPUNCTURE: CPT

## 2019-11-12 PROCEDURE — 97161 PT EVAL LOW COMPLEX 20 MIN: CPT

## 2019-11-12 PROCEDURE — 82962 GLUCOSE BLOOD TEST: CPT

## 2019-11-12 PROCEDURE — 80048 BASIC METABOLIC PNL TOTAL CA: CPT

## 2019-11-12 PROCEDURE — 97535 SELF CARE MNGMENT TRAINING: CPT

## 2019-11-12 PROCEDURE — 85027 COMPLETE CBC AUTOMATED: CPT

## 2019-11-12 PROCEDURE — C1769: CPT

## 2019-11-12 PROCEDURE — 97530 THERAPEUTIC ACTIVITIES: CPT

## 2019-11-12 PROCEDURE — 97116 GAIT TRAINING THERAPY: CPT

## 2020-08-20 NOTE — DISCHARGE NOTE PROVIDER - REASON FOR ADMISSION
Admitted/transferred from: [facility]  Reason for admission/transfer: Ischemic Stroke s/p TPA  Patient status upon admission/transfer: Stable  Interventions: Closely monitor  Plan: Closely monitor vitals and neuro status  2 RN skin assessment: completed by Christie Villanueva  Result of skin assessment and interventions/actions: L arm hematoma, bruising from venipuncture at OSH, Midline abd scar   Height, weight, drug calc weight: done  Patient belongings: Cell phone, ring, watch all with pt  MDRO education (if applicable): N/A   Left foot triple arthrodesis

## 2021-02-27 NOTE — PHYSICAL THERAPY INITIAL EVALUATION ADULT - LIVES WITH, PROFILE
Subjective: (As above and below)       This patient was seen in [facility] at [address] [place] [address] [facility] while in their vehicle due to COVID-19 pandemic with PPE and focused examination in order to decrease community viral transmission. The patient/guardian gave verbal consent to treat. Chief Complaint   Patient presents with    Covid Testing     exposed again on monday had noted h/a for 3 days      Maria D Villarreal is a 38 y.o. female who presents for COVID-19 testing  Was tested a little over a week ago and was negative. Today promoting NEW exposure and requests re testing  Denies any lingering respiratory symptoms, fevers or chills  Only symptom is mild 3/10 headache without any photophobia, chills or neck pain. Known Exposure to COVID-19: yes  Denies any symptoms currently or recently. Review of Systems - negative except as listed above    Reviewed PmHx, RxHx, FmHx, SocHx, AllgHx and updated in chart.   Family History   Problem Relation Age of Onset    Hypertension Father     Psychiatric Disorder Father         schzophrenia    Asthma Son     Asthma Son     Asthma Daughter        Past Medical History:   Diagnosis Date    Arthritis     Asthma     bronchitis    Bronchitis     HTN, goal below 140/90 9/28/2012    Psychiatric disorder     bipolar      Social History     Socioeconomic History    Marital status: SINGLE     Spouse name: Not on file    Number of children: Not on file    Years of education: Not on file    Highest education level: Not on file   Tobacco Use    Smoking status: Current Every Day Smoker     Packs/day: 1.00     Years: 12.00     Pack years: 12.00    Smokeless tobacco: Never Used   Substance and Sexual Activity    Alcohol use: No    Drug use: No    Sexual activity: Yes     Partners: Male     Birth control/protection: Condom          Current Outpatient Medications   Medication Sig    naproxen (NAPROSYN) 500 mg tablet Take 1 Tab by mouth two (2) times daily (with meals).  albuterol (ProAir HFA) 90 mcg/actuation inhaler Take 2 Puffs by inhalation every four (4) hours as needed for Wheezing.  topiramate (TOPAMAX) 50 mg tablet Take 50 mg by mouth daily.  ALPRAZOLAM PO Take  by mouth. No current facility-administered medications for this visit. Objective:     Vitals:    02/27/21 1137   Pulse: 80   Resp: 16   Temp: 99 °F (37.2 °C)   SpO2: 98%       Physical Exam  General appearance  appears well hydrated and does not appear toxic, no acute distress  Eyes - EOMs intact. Non injected. No scleral icterus   Ears - no external swelling  Neck/Lymphatics  trachea midline, no obvious swelling  Chest - normal breathing effort. No active cough heard. No audible wheezing. Heart - HR-see vitals  Skin - no observable rashes or pallor  Neurologic- alert and oriented x 3  Psychiatric- normal mood, behavior and though content. Assessment/ Plan:     1. Nonintractable headache, unspecified chronicity pattern, unspecified headache type    - AMB POC SARS-COV-2    2. Exposure to COVID-19 virus    - AMB POC SARS-COV-2      Rapid covid test- negative. Follow CDC guidelines for quarantine/self isolation  Mild headache no red flags- OTC supportive care   (tylenol) as directed. Will test for COVID  Should follow CDC guidelines for self isolation for any positive results.       Denise Hawkins, OMARI alone

## 2021-05-14 PROBLEM — D50.9 IRON DEFICIENCY ANEMIA, UNSPECIFIED: Chronic | Status: ACTIVE | Noted: 2019-09-30

## 2021-05-14 PROBLEM — C73 MALIGNANT NEOPLASM OF THYROID GLAND: Chronic | Status: ACTIVE | Noted: 2019-09-30

## 2021-05-14 PROBLEM — E11.9 TYPE 2 DIABETES MELLITUS WITHOUT COMPLICATIONS: Chronic | Status: ACTIVE | Noted: 2019-09-30

## 2021-05-14 PROBLEM — I34.1 NONRHEUMATIC MITRAL (VALVE) PROLAPSE: Chronic | Status: ACTIVE | Noted: 2019-09-30

## 2021-05-23 ENCOUNTER — APPOINTMENT (OUTPATIENT)
Dept: DISASTER EMERGENCY | Facility: CLINIC | Age: 66
End: 2021-05-23

## 2021-06-02 ENCOUNTER — TRANSCRIPTION ENCOUNTER (OUTPATIENT)
Age: 66
End: 2021-06-02

## 2021-06-03 ENCOUNTER — OUTPATIENT (OUTPATIENT)
Dept: OUTPATIENT SERVICES | Facility: HOSPITAL | Age: 66
LOS: 1 days | End: 2021-06-03
Payer: MEDICARE

## 2021-06-03 ENCOUNTER — RESULT REVIEW (OUTPATIENT)
Age: 66
End: 2021-06-03

## 2021-06-03 DIAGNOSIS — D50.9 IRON DEFICIENCY ANEMIA, UNSPECIFIED: ICD-10-CM

## 2021-06-03 DIAGNOSIS — Z98.890 OTHER SPECIFIED POSTPROCEDURAL STATES: Chronic | ICD-10-CM

## 2021-06-03 DIAGNOSIS — Z90.81 ACQUIRED ABSENCE OF SPLEEN: Chronic | ICD-10-CM

## 2021-06-03 DIAGNOSIS — Z90.710 ACQUIRED ABSENCE OF BOTH CERVIX AND UTERUS: Chronic | ICD-10-CM

## 2021-06-03 PROCEDURE — 88313 SPECIAL STAINS GROUP 2: CPT | Mod: 26

## 2021-06-03 PROCEDURE — 88305 TISSUE EXAM BY PATHOLOGIST: CPT

## 2021-06-03 PROCEDURE — 88305 TISSUE EXAM BY PATHOLOGIST: CPT | Mod: 26

## 2021-06-03 PROCEDURE — 88312 SPECIAL STAINS GROUP 1: CPT

## 2021-06-03 PROCEDURE — 88313 SPECIAL STAINS GROUP 2: CPT

## 2021-06-03 PROCEDURE — 43239 EGD BIOPSY SINGLE/MULTIPLE: CPT

## 2021-06-03 PROCEDURE — 82962 GLUCOSE BLOOD TEST: CPT

## 2021-06-03 PROCEDURE — 88312 SPECIAL STAINS GROUP 1: CPT | Mod: 26

## 2021-06-03 PROCEDURE — G0121: CPT

## 2021-06-04 LAB — SURGICAL PATHOLOGY STUDY: SIGNIFICANT CHANGE UP

## 2021-09-21 NOTE — CONSULT NOTE ADULT - CONSULT REQUESTED DATE/TIME
18-Oct-2019 15:19 Double Island Pedicle Flap Text: The defect edges were debeveled with a #15 scalpel blade.  Given the location of the defect, shape of the defect and the proximity to free margins a double island pedicle advancement flap was deemed most appropriate.  Using a sterile surgical marker, an appropriate advancement flap was drawn incorporating the defect, outlining the appropriate donor tissue and placing the expected incisions within the relaxed skin tension lines where possible.    The area thus outlined was incised deep to adipose tissue with a #15 scalpel blade.  The skin margins were undermined to an appropriate distance in all directions around the primary defect and laterally outward around the island pedicle utilizing iris scissors.  There was minimal undermining beneath the pedicle flap.

## 2021-10-15 ENCOUNTER — APPOINTMENT (OUTPATIENT)
Dept: PHYSICAL MEDICINE AND REHAB | Facility: CLINIC | Age: 66
End: 2021-10-15
Payer: MEDICARE

## 2021-10-15 DIAGNOSIS — Z86.39 PERSONAL HISTORY OF OTHER ENDOCRINE, NUTRITIONAL AND METABOLIC DISEASE: ICD-10-CM

## 2021-10-15 DIAGNOSIS — Z87.19 PERSONAL HISTORY OF OTHER DISEASES OF THE DIGESTIVE SYSTEM: ICD-10-CM

## 2021-10-15 DIAGNOSIS — Z85.71 PERSONAL HISTORY OF HODGKIN LYMPHOMA: ICD-10-CM

## 2021-10-15 DIAGNOSIS — Z86.79 PERSONAL HISTORY OF OTHER DISEASES OF THE CIRCULATORY SYSTEM: ICD-10-CM

## 2021-10-15 PROCEDURE — 99204 OFFICE O/P NEW MOD 45 MIN: CPT

## 2021-10-15 RX ORDER — METOPROLOL TARTRATE 75 MG/1
TABLET, FILM COATED ORAL
Refills: 0 | Status: ACTIVE | COMMUNITY

## 2021-10-15 RX ORDER — ASPIRIN 81 MG
81 TABLET, DELAYED RELEASE (ENTERIC COATED) ORAL
Refills: 0 | Status: ACTIVE | COMMUNITY

## 2021-10-15 RX ORDER — LEVOTHYROXINE SODIUM 0.12 MG/1
125 TABLET ORAL
Refills: 0 | Status: ACTIVE | COMMUNITY

## 2021-10-15 RX ORDER — PANTOPRAZOLE SODIUM 40 MG/10ML
40 INJECTION, POWDER, FOR SOLUTION INTRAVENOUS
Refills: 0 | Status: ACTIVE | COMMUNITY

## 2021-10-15 RX ORDER — METFORMIN HYDROCHLORIDE 625 MG/1
TABLET ORAL
Refills: 0 | Status: ACTIVE | COMMUNITY

## 2021-10-15 RX ORDER — ATORVASTATIN CALCIUM 80 MG/1
TABLET, FILM COATED ORAL
Refills: 0 | Status: ACTIVE | COMMUNITY

## 2021-10-15 NOTE — PHYSICAL EXAM
[FreeTextEntry1] : PE:\par Constitutional: In NAD, calm and cooperative\par HEENT: NCAT, Anicteric sclera, no lid-lag\par Cardio: Extremities appear pink and well perfused, no peripheral edema\par Respiratory: Normal respiratory effort on room air, no accessory muscle use\par Skin: no rashes seen on exposed skin, no visible abrasions\par Psych: Normal affect, intact judgment and insight\par Neuro: Awake, alert and oriented x 3, see below for focused neurological exam\par MSK (Back)\par 	Inspection: no gross swelling identified\par 	Palpation: Tenderness left lumbar paraspinals and over L SI joint\par 	ROM: Mild pain at end lumbar extension/no pain with lumbar flexion\par 	Strength: 5/5 strength in bilateral lower extremities\par 	Reflexes: 2+ Patella reflex bilaterally, 2+ Achilles reflex bilaterally, negative clonus bilaterally\par 	Sensation: Intact to light touch in bilateral lower extremities\par Special tests:\par SLR:negative bilaterally\par OSMANY:Positive on left\par FADIR: negative on left\par Facet loading: equivocal on left\par Yoeman's tesT: positive on left\par ASIS compression: positive on left

## 2021-10-15 NOTE — ASSESSMENT
[FreeTextEntry1] : Ms. KUSHAL GOLDBERG is a 66 year old female who presents with chronic low back pain, likely secondary to underlying lumbar spondylosis and sacroiliac joint pain, possibly related to abnormal gait pattern 2/2 to previous ankle injury/surgery. Denies any red flag signs. Will recommend:\par - GI and Orthopedic Operative notes reviewed. Previous X-ray L Spine reviewed\par - MRI L Spine given persistent pain despite 6+ weeks conservative therapy, along with her history of CA\par - Given history of gastritis found on endoscopy, advised patient to avoid NSAIDs if possible. Recommended she take Tylenol, up to 3g/day, for which she understood. \par - PT 2-3x/week for stretching, strengthening (especially of core muscles), ROM exercises, HEP and modalities PRN including myofascial release, moist heat, and TENS therapy \par - Discussed with patient the risks (including but not limited to bleeding, infection, nerve damage, etc), benefits and alternatives to a sacroiliac joint steroid injection under fluoroscopy for which patient understands. Will hold off on procedure for now while she tries PT. \par \par RTC in 4-5 weeks. Patient aware of red flag signs including any changes to their bowel/bladder control, groin numbness or new weakness. Patient knows to seek immediate attention by calling 911 or going to nearest ER if these symptoms appear. \par

## 2021-10-15 NOTE — DATA REVIEWED
[FreeTextEntry1] : X-ray LSpine 12/15/20  radiology, reviewed personally by me: multilevel degenerative changes seen, disc space narrowing seen\par \par PATIENT NAME: Tere Kelley\par PATIENT PHONE NUMBER:\par PATIENT ID: [id number]\par : 1955\par DATE OF EXAM: 12/15/2020\par R. Phys. Name: Rene Maya\par R. Phys. Address: [address] [place] [address], [address], [place] [address]\par R. Phys. Phone: [phone]\par LS SPINE XRAY AP AND LATERAL\par \par HISTORY: M54.5 Lower Back Pain\par \par Views: standing AP and lateral\par \par There are no acute fractures. Cortical margins are intact. Vertebral body heights are\par well-maintained.\par \par Moderate to marked disc space narrowing L2-3, L3-4, and L5-S1.\par \par There is mild multilevel osteophyte formation.\par \par Mild lower lumbar facet degenerative changes are present.\par \par Mild retrolisthesis L1-2 and L2-3.\par \par Sacroiliac joints demonstrate no degenerative changes..\par \par Lumbar lordotic curve is normal. There is mild dextroscoliosis.\par \par There is atherosclerotic calcification of the abdominal aorta.\par \par IMPRESSION:\par Mild dextroscoliosis. M41.7\par Mild multilevel lumbar degenerative changes.. M51.36, M51.37\par Degenerative disc disease L2-3, L3-4, and L5-S1.\par Mild retrolisthesis L1-2 and L2-3. M43.16\par \par Signed by: Chuck Morgan\par Signed Date: 12/15/2020 12:53 PM EST\par \par \par SIGNED BY: Chuck Morgan M.D., EXT [id number] 12/15/2020 12:53 PM\par

## 2021-10-15 NOTE — HISTORY OF PRESENT ILLNESS
[FreeTextEntry1] : Ms. THALIA GOLDBERG is a 66 year old female who presents with low back pain. \par \par Location:Left lower back/buttock area\par Onset:Tore Ligaments in left ankle back 50 years ago, eventually had surgery in 2019 which was an extensive surgery, which improved her pain a lot in the ankle but started gradually having "sciatica type" pain shortly after\par Provocation/Palliative:Worst with activity and walking, better with rest\par Quality:Achy, sharp\par Radiation:To L buttock area\par Severity:3/10 now, can be a moderate to severe with extending walking\par Timing:Not improving with time\par \par Denies any associated numbness. Denies any associated leg weakness. Denies any loss of bowel/bladder control or any groin numbness.\par Previous medications trialed:Took Ibuprofen in past, but had endoscopy for which her GI told her to avoid NSAIDs if possible. Currently takes Aleve PRN (maybe 1x/week) . Tylenol 1000mg Qdaily currently. \par Previous procedures relevant to complaint:None to the back region\par Conservative therapy tried?:Chiropractic x 1 year, PT x 3 months with good relief in past, does HEP for 6+ weeks

## 2021-10-26 ENCOUNTER — NON-APPOINTMENT (OUTPATIENT)
Age: 66
End: 2021-10-26

## 2021-11-22 ENCOUNTER — APPOINTMENT (OUTPATIENT)
Dept: PHYSICAL MEDICINE AND REHAB | Facility: CLINIC | Age: 66
End: 2021-11-22
Payer: MEDICARE

## 2021-11-22 VITALS
BODY MASS INDEX: 39.65 KG/M2 | RESPIRATION RATE: 14 BRPM | WEIGHT: 210 LBS | DIASTOLIC BLOOD PRESSURE: 89 MMHG | HEIGHT: 61 IN | SYSTOLIC BLOOD PRESSURE: 163 MMHG | HEART RATE: 108 BPM

## 2021-11-22 DIAGNOSIS — M51.36 OTHER INTERVERTEBRAL DISC DEGENERATION, LUMBAR REGION: ICD-10-CM

## 2021-11-22 PROCEDURE — 99213 OFFICE O/P EST LOW 20 MIN: CPT

## 2021-11-22 NOTE — HISTORY OF PRESENT ILLNESS
[FreeTextEntry1] : Ms. KUSHAL GOLDBERG is a 66 year old  female who presents for follow up. At last visit, she was ordered an MRI L Spine, recommended to try Tylenol PRN, started on PT. Patient feels better today. She has increased her walking tolerance, was able to walk in the mall the other day. Denies any new symptoms. \par \par Location:Left lower back/buttock area\par Onset:Tore Ligaments in left ankle back 50 years ago, eventually had surgery in 2019 which was an extensive surgery, which improved her pain a lot in the ankle but started gradually having "sciatica type" pain shortly after\par Provocation/Palliative:Worst with activity and walking, better with rest\par Quality:Achy, sharp\par Radiation:To L buttock area, nothing down leg\par Severity:minimal, can be a moderate to severe with extending walking\par Timing:Improving with time, especially with PT\par \par No bowel/bladder changes. No groin numbness.

## 2021-11-22 NOTE — ASSESSMENT
[FreeTextEntry1] : Ms. KUSHAL GOLDBERG is a 66 year old female who presents with chronic low back pain, likely secondary to underlying lumbar spondylosis and sacroiliac joint pain, possibly related to abnormal gait pattern 2/2 to previous ankle injury/surgery. Patient improving with PT. Denies any red flag signs. Will recommend:\par - MRI Reviewed with patient. Patient has multiple areas of nerve impingement but no radicular symptoms. Renal csyst are being followed by her PCP. \par - Given history of gastritis found on endoscopy, advised patient to avoid NSAIDs if possible. Recommended she take Tylenol, up to 3g/day, for which she understood. \par - Continue PT 2-3x/week for stretching, strengthening (especially of core muscles), ROM exercises, HEP and modalities PRN including myofascial release, moist heat, and TENS therapy \par \par RTC in 4-5 weeks. If not improved, can consider SI joint injection in future.  Patient aware of red flag signs including any changes to their bowel/bladder control, groin numbness or new weakness. Patient knows to seek immediate attention by calling 911 or going to nearest ER if these symptoms appear. \par

## 2021-11-22 NOTE — DATA REVIEWED
[FreeTextEntry1] : PATIENT NAME: Tere Kelley\par PATIENT PHONE NUMBER:\par PATIENT ID: [id number]\par : 1955\par DATE OF EXAM: 10/22/2021\par R. Phys. Name: Raul Hester\par R. Phys. Address: [address] [place] [address]\par R. Phys. Phone: [phone]\par MRI-3T LUMBAR SPINE NON CONTRAST\par \par HISTORY: R26.2 Difficulty Walking M54.42 Lower back pain with left\par sciatica\par \par Technique: Multiplanar, multisequence MRI of the lumbar spine was performed\par without the administration of intravenous contrast .\par \par Findings:\par \par \par Alignment: There is retrolisthesis at L2-L3 and lesser extent L5/S1.\par \par Conus: The conus is normal in size and signal.\par \par Bone marrow: No evidence of metastatic disease or acute vertebral body\par compression fracture. There is type I Modic marrow signal change at L3-L4.\par \par Discs:Multilevel disc desiccation and disc space narrowing.\par \par At L5-S1: There is disc bulge and facet arthropathy. There is right greater than\par left subarticular recess stenosis with impingement descending S1 nerve roots.\par There is right frontal left neuroforaminal stenosis with impingement of exiting\par right L5 nerve root.\par \par At L4-5: There is disc bulge, facet arthropathy and thickened ligamentum flavum.\par There is mild to moderate spinal canal stenosis. There is mild to moderate right\par neuroforaminal stenosis.\par \par At L3-4: There is disc bulge, retrolisthesis and mild widening of the facet\par joints. There is moderate spinal canal stenosis. There is bilateral left worse\par than right neuroforaminal stenosis with impingement of exiting nerve roots.\par \par At L2-3: There is disc bulge and central disc protrusion with mild spinal canal\par bilateral neural foraminal stenosis.\par \par At L1-2: No significant spinal canal or neural foraminal stenosis.\par \par There are bilateral renal cysts.\par \par At T11-T12 there is mild disc bulge without significant spinal canal stenosis.\par \par IMPRESSION:\par \par \par Moderate multilevel degenerative change detailed above.\par \par Moderate spinal canal stenosis L3-L4. Mild to moderate spinal canal stenosis\par L4-L5.\par \par Bilateral subarticular recess stenosis at L5-S1 with impingement of descending\par S1 nerve roots.\par \par Multilevel neural foraminal stenosis. 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 [phone] 10/24/2021 12:58 PM

## 2021-12-27 ENCOUNTER — APPOINTMENT (OUTPATIENT)
Dept: PHYSICAL MEDICINE AND REHAB | Facility: CLINIC | Age: 66
End: 2021-12-27
Payer: MEDICARE

## 2021-12-27 VITALS
SYSTOLIC BLOOD PRESSURE: 144 MMHG | WEIGHT: 215 LBS | RESPIRATION RATE: 12 BRPM | HEIGHT: 61 IN | DIASTOLIC BLOOD PRESSURE: 84 MMHG | HEART RATE: 105 BPM | BODY MASS INDEX: 40.59 KG/M2

## 2021-12-27 PROCEDURE — 99213 OFFICE O/P EST LOW 20 MIN: CPT | Mod: GC

## 2021-12-27 NOTE — ASSESSMENT
[FreeTextEntry1] : Ms. KUSHAL GOLDBERG is a 66 year old female who presents with chronic low back pain, likely secondary to underlying lumbar spondylosis and sacroiliac joint pain, possibly related to abnormal gait pattern 2/2 to previous ankle injury/surgery. Patient improving with PT but pain and discomfort persisting. Denies any red flag signs. Will recommend:\par - Referral for OMM w/ Dr Gee provided \par - Discussed with patient the risks (including but not limited to bleeding, infection, nerve damage, etc), benefits and alternatives to a sacroiliac joint injection for which patient understands. Patient would need cardiology and PCP clearance as well as COVID PCR test. Patient will hold off of procedure for now as she tries more PT and Lidocaine patches. \par - Given history of gastritis found on endoscopy, advised patient to avoid NSAIDs if possible. Recommended she take Tylenol, up to 3g/day, for which she understood. \par - Recommended Lidocaine 4% OTC patches. \par - Continue PT 2-3x/week for stretching, strengthening (especially of core muscles), ROM exercises, HEP and modalities PRN including myofascial release, moist heat, and TENS therapy \par \par RTC in 2 weeks. If not improved, can consider SI joint injection in future.  Patient aware of red flag signs including any changes to their bowel/bladder control, groin numbness or new weakness. Patient knows to seek immediate attention by calling 911 or going to nearest ER if these symptoms appear. \par

## 2021-12-27 NOTE — HISTORY OF PRESENT ILLNESS
[FreeTextEntry1] : Ms. KUSHAL GOLDBREG is a 66 year old  female who presents for follow up. At last visit, she was recommended to continue Tylenol, avoid NSAIDs and continue PT.  Has been doing PT notes has not been getting long lasting relief. Has been doing exercises (stretching) at home but continues to note tightness. Minimal pain today. Feels more stiffness than pain. Has been taking 2-3 aleve or motrin as needed for severe pain or planned strenuous activity, sparingly. Notes this issue is persisting despite PT. \par \par Location:Left lower back/buttock area\par Onset:Tore Ligaments in left ankle back 50 years ago, eventually had surgery in 2019 which was an extensive surgery, which improved her pain a lot in the ankle but started gradually having "sciatica type" pain shortly after\par Provocation/Palliative:Worst with activity and walking, better with rest\par Quality:Achy, sharp\par Radiation:To L buttock area, nothing down leg\par Severity:minimal, can be a moderate to severe with extending walking\par Timing:Improving with time, especially with PT\par \par No bowel/bladder changes. No groin numbness.

## 2022-01-11 ENCOUNTER — APPOINTMENT (OUTPATIENT)
Dept: PHYSICAL MEDICINE AND REHAB | Facility: CLINIC | Age: 67
End: 2022-01-11
Payer: MEDICARE

## 2022-01-11 VITALS
WEIGHT: 215 LBS | TEMPERATURE: 97.1 F | SYSTOLIC BLOOD PRESSURE: 128 MMHG | DIASTOLIC BLOOD PRESSURE: 66 MMHG | BODY MASS INDEX: 40.59 KG/M2 | HEART RATE: 112 BPM | HEIGHT: 61 IN

## 2022-01-11 PROCEDURE — 99213 OFFICE O/P EST LOW 20 MIN: CPT | Mod: 25

## 2022-01-11 PROCEDURE — 98927 OSTEOPATH MANJ 5-6 REGIONS: CPT

## 2022-01-11 NOTE — DATA REVIEWED
[FreeTextEntry1] : PATIENT NAME: Tere Kelley\par PATIENT PHONE NUMBER:\par PATIENT ID: [id number]\par : 1955\par DATE OF EXAM: 10/22/2021\par R. Phys. Name: Raul Hester\par R. Phys. Address: [address] [place] [address]\par R. Phys. Phone: [phone]\par MRI-3T LUMBAR SPINE NON CONTRAST\par \par HISTORY: R26.2 Difficulty Walking M54.42 Lower back pain with left\par sciatica\par \par Technique: Multiplanar, multisequence MRI of the lumbar spine was performed\par without the administration of intravenous contrast.\par \par Findings:\par \par \par Alignment: There is retrolisthesis at L2-L3 and lesser extent L5/S1.\par \par Conus: The conus is normal in size and signal.\par \par Bone marrow: No evidence of metastatic disease or acute vertebral body\par compression fracture. There is type I Modic marrow signal change at L3-L4.\par \par Discs:Multilevel disc desiccation and disc space narrowing.\par \par At L5-S1: There is disc bulge and facet arthropathy. There is right greater than\par left subarticular recess stenosis with impingement descending S1 nerve roots.\par There is right frontal left neuroforaminal stenosis with impingement of exiting\par right L5 nerve root.\par \par At L4-5: There is disc bulge, facet arthropathy and thickened ligamentum flavum.\par There is mild to moderate spinal canal stenosis. There is mild to moderate right\par neuroforaminal stenosis.\par \par At L3-4: There is disc bulge, retrolisthesis and mild widening of the facet\par joints. There is moderate spinal canal stenosis. There is bilateral left worse\par than right neuroforaminal stenosis with impingement of exiting nerve roots.\par \par At L2-3: There is disc bulge and central disc protrusion with mild spinal canal\par bilateral neural foraminal stenosis.\par \par At L1-2: No significant spinal canal or neural foraminal stenosis.\par \par There are bilateral renal cysts.\par \par At T11-T12 there is mild disc bulge without significant spinal canal stenosis.\par \par IMPRESSION:\par \par \par Moderate multilevel degenerative change detailed above.\par \par Moderate spinal canal stenosis L3-L4. Mild to moderate spinal canal stenosis\par L4-L5.\par \par Bilateral subarticular recess stenosis at L5-S1 with impingement of descending\par S1 nerve roots.\par \par Multilevel neural foraminal stenosis. 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

## 2022-01-11 NOTE — PHYSICAL EXAM
[FreeTextEntry1] : Gen: Patient is A&O x 3, NAD\par HEENT: EOMI, hearing grossly normal\par Resp: regular, non - labored\par CV: pulses regular\par Skin: no rashes, erythema\par Lymph: no clubbing, cyanosis, edema, or palpable lymphadenopathy\par Inspection: no instability or misalignment\par ROM: full throughout\par Palpation: TTP left PSIS\par Sensation: intact to light touch\par Reflexes: 1+ and symmetric throughout\par Strength: 5/5 throughout\par Special tests: -Straight leg raise \par Gait: normal, non-antalgic\par \par Osteopathic Structural Exam:\par \par Rib: Left posterior Ribs 11-12\par Lumbar: Left L4 tenderpoint\par Pelvis: Left medial innominate\par Sacrum: Left sacral torsion \par Lower Extremity: Left lower extremity externally rotated \par \par \par

## 2022-01-11 NOTE — ASSESSMENT
[FreeTextEntry1] : 66 year old female presenting for evaluation.\par \par #Left SI joint pain:\par -MRI lumbar spine and pain medicine notes reviewed\par -No neurologic deficits on exam\par -Discussed risks and benefits of OMT\par -Osteopathic structural exam demonstrated somatic dysfunction and the patient agreed to osteopathic manipulation.\par  \par \par 1. Somatic dysfunction ribs\par --OMT performed with muscle energy \par 2. Somatic dysfunction lumbar \par --OMT performed with counterstrain \par 3. Somatic dysfunction pelvis\par --OMT performed with muscle energy \par 4. Somatic dysfunction sacrum\par --OMT performed with myofascial release\par 5. Somatic dysfunction lower extremity \par --OMT performed with myofascial release\par \par Patient tolerated treatment well.\par \par Follow up in 2-3 weeks.  \par \par

## 2022-01-11 NOTE — HISTORY OF PRESENT ILLNESS
[FreeTextEntry1] : Ms. Kelley is a 66 year old female presenting for evaluation of left low back pain.  She reports she has had chronic ankle difficulties with recent surgery in 2019.  She describes that since the surgery she has had difficulty with her gait and her walking.  She thinks that this is the reason why she is having low back pain.  She denies any radiation of pain down the legs.  She denies any bowel bladder dysfunction.  She denies any weakness.  She is currently in physical therapy which she thinks is helping.  Takes ibuprofen occasionally which helps.\par \par Left ankle surgery, thryoidectomy, splecectomy, abdominal hernia repair

## 2022-02-08 ENCOUNTER — APPOINTMENT (OUTPATIENT)
Dept: PHYSICAL MEDICINE AND REHAB | Facility: CLINIC | Age: 67
End: 2022-02-08
Payer: MEDICARE

## 2022-02-08 PROCEDURE — 98927 OSTEOPATH MANJ 5-6 REGIONS: CPT

## 2022-02-08 RX ORDER — LOSARTAN POTASSIUM 100 MG/1
100 TABLET, FILM COATED ORAL
Qty: 90 | Refills: 0 | Status: DISCONTINUED | COMMUNITY
Start: 2022-01-12

## 2022-02-08 RX ORDER — AMOXICILLIN 500 MG/1
500 CAPSULE ORAL
Qty: 21 | Refills: 0 | Status: DISCONTINUED | COMMUNITY
Start: 2022-01-17

## 2022-02-08 NOTE — ASSESSMENT
[FreeTextEntry1] : 66 year old female presenting for evaluation.\par \par #Left SI joint pain:\par -Discussed with patient we may consider new imaging given her new diagnosis of breast cancer, will await to discern what further imaging is ordered by oncology team\par -No neurologic deficits on exam\par -Discussed risks and benefits of OMT\par -Osteopathic structural exam demonstrated somatic dysfunction and the patient agreed to osteopathic manipulation.\par  \par \par 1. Somatic dysfunction ribs\par --OMT performed with muscle energy \par 2. Somatic dysfunction lumbar \par --OMT performed with counterstrain \par 3. Somatic dysfunction pelvis\par --OMT performed with muscle energy \par 4. Somatic dysfunction sacrum\par --OMT performed with myofascial release\par 5. Somatic dysfunction lower extremity \par --OMT performed with myofascial release\par \par Patient tolerated treatment well.\par \par Follow up in 2-3 weeks.  \par \par

## 2022-02-08 NOTE — HISTORY OF PRESENT ILLNESS
[FreeTextEntry1] : Ms. Kelley is a 66 year old female presenting for evaluation of left low back pain.  She reports she has had chronic ankle difficulties with recent surgery in 2019.  She describes that since the surgery she has had difficulty with her gait and her walking.  She thinks that this is the reason why she is having low back pain.  She denies any radiation of pain down the legs.  She denies any bowel bladder dysfunction.  She denies any weakness.  She is currently in physical therapy which she thinks is helping.  Takes ibuprofen occasionally which helps.\par \par Left ankle surgery, thryoidectomy, splecectomy, abdominal hernia repair \par \par Interval history:\par She reports that OMT did significantly help her symptoms since last visit.  Still worst pain is located in the left lower back region.  She denies any new weakness numbness tingling bowel bladder dysfunction.  Unfortunately she was recently diagnosed with breast cancer.  Currently still awaiting full plan for treatment.

## 2022-03-08 ENCOUNTER — APPOINTMENT (OUTPATIENT)
Dept: PHYSICAL MEDICINE AND REHAB | Facility: CLINIC | Age: 67
End: 2022-03-08
Payer: MEDICARE

## 2022-03-08 DIAGNOSIS — M99.08 SEGMENTAL AND SOMATIC DYSFUNCTION OF RIB CAGE: ICD-10-CM

## 2022-03-08 DIAGNOSIS — M99.05 SEGMENTAL AND SOMATIC DYSFUNCTION OF PELVIC REGION: ICD-10-CM

## 2022-03-08 DIAGNOSIS — M99.06 SEGMENTAL AND SOMATIC DYSFUNCTION OF LOWER EXTREMITY: ICD-10-CM

## 2022-03-08 DIAGNOSIS — Z91.89 OTHER SPECIFIED PERSONAL RISK FACTORS, NOT ELSEWHERE CLASSIFIED: ICD-10-CM

## 2022-03-08 DIAGNOSIS — C50.912 MALIGNANT NEOPLASM OF UNSPECIFIED SITE OF LEFT FEMALE BREAST: ICD-10-CM

## 2022-03-08 DIAGNOSIS — M99.03 SEGMENTAL AND SOMATIC DYSFUNCTION OF LUMBAR REGION: ICD-10-CM

## 2022-03-08 DIAGNOSIS — M99.04 SEGMENTAL AND SOMATIC DYSFUNCTION OF SACRAL REGION: ICD-10-CM

## 2022-03-08 PROCEDURE — 99213 OFFICE O/P EST LOW 20 MIN: CPT | Mod: 25

## 2022-03-08 PROCEDURE — 93702 BIS XTRACELL FLUID ANALYSIS: CPT

## 2022-03-08 PROCEDURE — 98927 OSTEOPATH MANJ 5-6 REGIONS: CPT | Mod: 59

## 2022-03-08 RX ORDER — OLANZAPINE 5 MG/1
5 TABLET, FILM COATED ORAL
Qty: 8 | Refills: 0 | Status: COMPLETED | COMMUNITY
Start: 2022-03-02

## 2022-03-08 NOTE — HISTORY OF PRESENT ILLNESS
[FreeTextEntry1] : Ms. Kelley is a 66 year old female presenting for evaluation of left low back pain.  She reports she has had chronic ankle difficulties with recent surgery in 2019.  She describes that since the surgery she has had difficulty with her gait and her walking.  She thinks that this is the reason why she is having low back pain.  She denies any radiation of pain down the legs.  She denies any bowel bladder dysfunction.  She denies any weakness.  She is currently in physical therapy which she thinks is helping.  Takes ibuprofen occasionally which helps.\par \par Left ankle surgery, thryoidectomy, splecectomy, abdominal hernia repair \par \par Recent diagnosis of left breast cancer with positive node involvement.  Staring chemotherapy to be followed by surgery.\par \par She reports that she is continuing to have pain in her left low back.  She does report OMT continues to help and overall she does notice that she is feeling improvement.  She denies any new weakness numbness tingling bowel bladder dysfunction.  She denies any swelling in her left upper extremity.

## 2022-03-08 NOTE — ASSESSMENT
[FreeTextEntry1] : 66 year old female presenting for evaluation.\par \par #Breast cancer:\par -New diagnosis of left breast cancer\par -Educated on home exercise program to maintain shoulder ROM and pectoral muscle stretching to prepare her for breast surgery \par \par #At risk for Lymphedema:\par -No clinical signs of lymphedema \par -Lymphedema education provided for patient\par -Bioimpedance spectroscopy performed today with L-dex -2.7 LUE, repeat after surgery for continued surveillance \par \par #Left SI joint pain:\par -No neurologic deficits on exam\par -Discussed risks and benefits of OMT\par -Osteopathic structural exam demonstrated somatic dysfunction and the patient agreed to osteopathic manipulation.\par  \par \par 1. Somatic dysfunction ribs\par --OMT performed with myofascial release\par 2. Somatic dysfunction lumbar \par --OMT performed with counterstrain \par 3. Somatic dysfunction pelvis\par --OMT performed with muscle energy \par 4. Somatic dysfunction sacrum\par --OMT performed with myofascial release\par 5. Somatic dysfunction lower extremity \par --OMT performed with myofascial release\par \par Patient tolerated treatment well.\par \par Follow up in 3-4 weeks.  \par \par The patient had a baseline SOZO measurement, which I reviewed today. The score is -2.7. Bioimpedance spectroscopy helps identify the onset of lymphedema in an arm or leg before patients experience noticeable swelling. Research has shown that the early detection of lymphedema using L-Dex combined with treatment can reduce progression to chronic lymphedema by 95% in breast cancer patients. Whenever possible, patients are tested for baseline L-Dex score before cancer treatment begins and then are reassessed during regular follow-up visits using the SOZO device. Otherwise, this can be started postoperatively and continued during regular follow-up visits. If the patient’s L-Dex score increases above normal levels, that is a sign that lymphedema is developing, and a referral is made to physical therapy for further evaluation and/or early compression treatment. Lymphedema assessment with the SOZO L-Dex score is recommended to be done every 3 months for the first 3 years and then every 6 months for years 4 and 5, followed by annually afterwards.\par \par

## 2022-03-08 NOTE — PHYSICAL EXAM
[FreeTextEntry1] : Gen: Patient is A&O x 3, NAD\par HEENT: EOMI, hearing grossly normal\par Resp: regular, non - labored\par CV: pulses regular\par Skin: no rashes, erythema\par Lymph: no clubbing, cyanosis, edema, or palpable lymphadenopathy\par Inspection: no instability or misalignment\par ROM: full throughout in bilateral shoulders \par Palpation: TTP left PSIS\par Sensation: intact to light touch\par Reflexes: 1+ and symmetric throughout\par Strength: 5/5 throughout\par Special tests: -Straight leg raise \par Gait: normal, non-antalgic\par \par Bioimpedance spectroscopy performed today with L-Dex -2.7 LUE (pre-operative baseline).  \par \par Osteopathic Structural Exam:\par \par Rib: Left posterior Ribs 10-12\par Lumbar: Left L3, L5 tenderpoint posterior \par Pelvis: Left medial innominate\par Sacrum: Sacral extension \par Lower Extremity: Left lower extremity restricted in internal rotation \par \par \par

## 2022-03-11 ENCOUNTER — TRANSCRIPTION ENCOUNTER (OUTPATIENT)
Age: 67
End: 2022-03-11

## 2022-08-23 NOTE — H&P PST ADULT - ALCOHOL USE HISTORY SINGLE SELECT
Detail Level: Detailed Detail Level: Zone Sunscreen Recommendations: otc broad spectrum sunscreen with minimum spf of 30 or higher Sunscreen Recommendations: otc broad spectrum sunscreen minimum spf 30 never

## 2022-11-15 ENCOUNTER — APPOINTMENT (OUTPATIENT)
Dept: ORTHOPEDIC SURGERY | Facility: CLINIC | Age: 67
End: 2022-11-15

## 2023-01-19 ENCOUNTER — APPOINTMENT (OUTPATIENT)
Dept: PHYSICAL MEDICINE AND REHAB | Facility: CLINIC | Age: 68
End: 2023-01-19
Payer: MEDICARE

## 2023-01-19 VITALS
BODY MASS INDEX: 39.46 KG/M2 | SYSTOLIC BLOOD PRESSURE: 147 MMHG | DIASTOLIC BLOOD PRESSURE: 85 MMHG | HEART RATE: 105 BPM | HEIGHT: 61 IN | WEIGHT: 209 LBS | RESPIRATION RATE: 14 BRPM

## 2023-01-19 DIAGNOSIS — Z85.3 PERSONAL HISTORY OF MALIGNANT NEOPLASM OF BREAST: ICD-10-CM

## 2023-01-19 PROCEDURE — 99213 OFFICE O/P EST LOW 20 MIN: CPT | Mod: GC

## 2023-01-19 NOTE — ASSESSMENT
[FreeTextEntry1] : Ms. KUSHAL GOLDBERG is a 66 year old female who presents with chronic low back pain, likely secondary to underlying lumbar spondylosis and sacroiliac joint pain, possibly related to abnormal gait pattern 2/2 to previous ankle injury/surgery. Patient reports new proximal leg weakness since chemotherapy which has been resolving but is still present as well as R ankle pain. Denies any red flag signs. Will recommend:\par - right ankle XR, lumbar MRI given new weakness and recent cancer diagnosis\par - Voltaren gel up to 3x day to right ankle\par - Given history of gastritis found on endoscopy, advised patient to avoid NSAIDs if possible. Recommended she take Tylenol, up to 3g/day, for which she understood. \par - Start PT 2-3x/week for stretching, strengthening (especially of core muscles), ROM exercises, HEP and modalities PRN including myofascial release, moist heat, and TENS therapy \par \par RTC in 4-5 weeks. May consider SI joint injection in future. Patient aware of red flag signs including any changes to their bowel/bladder control, groin numbness or new weakness. Patient knows to seek immediate attention by calling 911 or going to nearest ER if these symptoms appear. \par

## 2023-01-19 NOTE — HISTORY OF PRESENT ILLNESS
[FreeTextEntry1] : Ms. KUSHAL GOLDBERG is a 67 year old female who presents for follow up. At last visit on 12/27/21, she was referred to Dr. Gee for OMM, spoke about a SI joint injection, told to stick with Tylenol over NSAIDs given hx of gastritis, try Lidocaine 4% OTC patches and continue PT and told to f/u in 2 weeks. 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 She has had an unsteady gait since chemotherapy and thinks she may have rolled the ankle on a rock. Pain is on the medial aspect of her ankle. She denies N/T in the feet. She also presents with chronic low back pain, unchanged from prior.\par \par Location:Left lower back/buttock area, R ankle\par Onset:Tore Ligaments in left ankle back 50 years ago, eventually had surgery in 2019 which was an extensive surgery, which improved her pain a lot in the ankle but started gradually having "sciatica type" pain shortly after\par Provocation/Palliative:Worst with activity and walking, better with rest\par Quality:Achy, sharp\par Radiation:To L buttock area, nothing down leg\par Severity:minimal, can be a moderate to severe with extending walking\par Timing:Usually worse at end of day\par \par No bowel/bladder changes. No groin numbness.

## 2023-01-19 NOTE — PHYSICAL EXAM
[FreeTextEntry1] : PE:\par Constitutional: In NAD, calm and cooperative\par MSK (Back)\par 	Inspection: no gross swelling identified\par 	Palpation: Tenderness left lumbar paraspinals and over L SI joint\par 	ROM: Mild pain at end lumbar extension/no pain with lumbar flexion\par 	Strength: 4/5 HF bilaterally, otherwise 5/5 strength in bilateral lower extremities\par 	Reflexes: 2+ Patella reflex bilaterally, 2+ Achilles reflex bilaterally, negative clonus bilaterally\par 	Sensation: Intact to light touch in bilateral lower extremities\par Special tests:\par SLR:negative bilaterally\par OSMANY:Positive on left\par FADIR: negative on left\par Facet loading: equivocal on left\par Yeoman's test: positive on left\par ASIS compression: positive on left\par \par MSK (RIGHT LE):\par Inspection: no swelling on medial aspect of ankle\par Palpation: tenderness over medial ligaments of ankle\par ROM: No pain with ankle DF, PF, eversion or inversion\par Special tests: Negative anterior drawer

## 2023-02-09 ENCOUNTER — APPOINTMENT (OUTPATIENT)
Dept: PHYSICAL MEDICINE AND REHAB | Facility: CLINIC | Age: 68
End: 2023-02-09

## 2023-03-03 ENCOUNTER — APPOINTMENT (OUTPATIENT)
Dept: PHYSICAL MEDICINE AND REHAB | Facility: CLINIC | Age: 68
End: 2023-03-03
Payer: MEDICARE

## 2023-03-03 DIAGNOSIS — G89.29 LOW BACK PAIN, UNSPECIFIED: ICD-10-CM

## 2023-03-03 DIAGNOSIS — M54.50 LOW BACK PAIN, UNSPECIFIED: ICD-10-CM

## 2023-03-03 DIAGNOSIS — M25.571 PAIN IN RIGHT ANKLE AND JOINTS OF RIGHT FOOT: ICD-10-CM

## 2023-03-03 DIAGNOSIS — M47.816 SPONDYLOSIS W/OUT MYELOPATHY OR RADICULOPATHY, LUMBAR REGION: ICD-10-CM

## 2023-03-03 DIAGNOSIS — M53.3 SACROCOCCYGEAL DISORDERS, NOT ELSEWHERE CLASSIFIED: ICD-10-CM

## 2023-03-03 PROCEDURE — 99213 OFFICE O/P EST LOW 20 MIN: CPT | Mod: 95

## 2023-03-03 NOTE — DATA REVIEWED
[FreeTextEntry1] : \par Office Location: [address] [place] [address]\par Office Phone: [phone]\par Office Fax: [phone]\par PATIENT NAME: Tere Kelley\par PATIENT PHONE NUMBER: [phone]\par PATIENT ID: [id number]\par : 1955\par DATE OF EXAM: 2023\par R. Phys. Name: Raul Hester\par R. Phys. Address: [address] [place] [address]\par R. Phys. Phone: [phone]\par RIGHT ANKLE XRAY AP AND LATERAL 2 VIEWS\par \par HISTORY: M70.871 Right ankle/foot, other tissue disorders M25.571 Right\par ankle pain\par \par AP, and lateral views of the right ankle are obtained.\par \par Findings: Bony texture appears unremarkable. No fracture or dislocation present.\par Joint spaces appear intact. No significant soft tissue abnormalities\par demonstrated.There is moderate size calcaneal plantar spur present.\par \par IMPRESSION:\par \par \par Unremarkable right ankle examination with moderate size calcaneal spur.\par \par \par \par \par \par \par \par Signed by: Declan Trujillo MD\par Signed Date: 2023 4:57 PM EST\par \par \par \par SIGNED BY: Declan Trujillo M.D., Ext. [phone] 04:57 PM\par

## 2023-03-03 NOTE — HISTORY OF PRESENT ILLNESS
[FreeTextEntry1] : This visit was conducted via an audiovisual call. Patient confirmed they were at home at [address] [place] [address]\[place] [address] . Dr. Hester was the office at [address] [place] [address] . Patient consented to the televisit. \par \par Ms. KUSHAL GOLDBERG is a 67 year old  female who presents for follow up. At last visit, she was ordered a X-ray R Ankle, MRI L Spine, voltaren gel, told to avoid NSAIDs, and start PT. Since last visit, she had an episode of L eye blurriness, for which she was worked up for CVA (found to have 2 small strokes). She did start PT which has helped her back pain somewhat. She was also found to have a "leaky" aortic valve, currently seeing a cardio. \par \par Location:Left lower back/buttock area, R ankle\par Onset:Tore Ligaments in left ankle back 50 years ago, eventually had surgery in 2019 which was an extensive surgery, which improved her pain a lot in the- ankle but started gradually having "sciatica type" pain shortly after\par Provocation/Palliative:Worst with activity and walking, better with rest\par Quality:Achy, sharp\par Radiation:To L buttock area, nothing down leg\par Severity:minimal, can be a moderate to severe with extending walking\par Timing:Usually worse at end of day\par \par No bowel/bladder changes. No groin numbness.

## 2023-03-03 NOTE — ASSESSMENT
[FreeTextEntry1] : Ms. KUSHAL GOLDBERG is a 66 year old female who presents with chronic low back pain, likely secondary to underlying lumbar spondylosis and sacroiliac joint pain, possibly related to abnormal gait pattern 2/2 to previous ankle injury/surgery. Patient reports new proximal leg weakness since chemotherapy which has been resolving but is still present as well as R ankle pain. Denies any red flag signs. Will recommend:\par - Still pending MRI L Spine\par - Continue Tylenol PRN  up to 3g/day. \par - Given history of gastritis found on endoscopy, advised patient to avoid NSAIDs if possible. Recommended she take Tylenol, up to 3g/day, for which she understood. \par - Start PT 2-3x/week for stretching, strengthening (especially of core muscles), ROM exercises, HEP and modalities PRN including myofascial release, moist heat, and TENS therapy \par \par RTC in 4-5 weeks. May consider SI joint injection in future. Patient aware of red flag signs including any changes to their bowel/bladder control, groin numbness or new weakness. Patient knows to seek immediate attention by calling 911 or going to nearest ER if these symptoms appear. \par

## 2023-04-24 ENCOUNTER — NON-APPOINTMENT (OUTPATIENT)
Age: 68
End: 2023-04-24

## 2023-07-12 ENCOUNTER — OFFICE (OUTPATIENT)
Dept: URBAN - METROPOLITAN AREA CLINIC 12 | Facility: CLINIC | Age: 68
Setting detail: OPHTHALMOLOGY
End: 2023-07-12
Payer: MEDICARE

## 2023-07-12 DIAGNOSIS — H52.4: ICD-10-CM

## 2023-07-12 DIAGNOSIS — I63.50: ICD-10-CM

## 2023-07-12 PROBLEM — H52.7 REFRACTIVE ERROR: Status: ACTIVE | Noted: 2023-07-12

## 2023-07-12 PROCEDURE — 92015 DETERMINE REFRACTIVE STATE: CPT | Performed by: STUDENT IN AN ORGANIZED HEALTH CARE EDUCATION/TRAINING PROGRAM

## 2023-07-12 PROCEDURE — 92083 EXTENDED VISUAL FIELD XM: CPT | Performed by: STUDENT IN AN ORGANIZED HEALTH CARE EDUCATION/TRAINING PROGRAM

## 2023-07-12 PROCEDURE — 99204 OFFICE O/P NEW MOD 45 MIN: CPT | Performed by: STUDENT IN AN ORGANIZED HEALTH CARE EDUCATION/TRAINING PROGRAM

## 2023-07-12 ASSESSMENT — REFRACTION_AUTOREFRACTION
OS_SPHERE: +4.00
OD_AXIS: 094
OD_SPHERE: +4.25
OD_CYLINDER: -0.75
OS_AXIS: 096
OS_CYLINDER: -1.25

## 2023-07-12 ASSESSMENT — KERATOMETRY
OD_K1POWER_DIOPTERS: 42.50
OD_K2POWER_DIOPTERS: 42.25
METHOD_AUTO_MANUAL: AUTO
OD_AXISANGLE_DEGREES: 102
OS_AXISANGLE_DEGREES: 035
OS_K1POWER_DIOPTERS: 42.25
OS_K2POWER_DIOPTERS: 42.75

## 2023-07-12 ASSESSMENT — REFRACTION_MANIFEST
OS_SPHERE: +4.00
OD_CYLINDER: -0.75
OD_ADD: +2.50
OD_VA1: 20/20-3
OS_ADD: +2.50
OD_AXIS: 090
OS_AXIS: 090
OS_CYLINDER: -1.00
OD_SPHERE: +4.25
OS_VA1: 20/20-

## 2023-07-12 ASSESSMENT — REFRACTION_CURRENTRX
OD_VPRISM_DIRECTION: PROGS
OD_ADD: +2.25
OS_CYLINDER: -0.75
OS_VPRISM_DIRECTION: PROGS
OS_AXIS: 089
OS_ADD: +2.00
OS_OVR_VA: 20/
OS_SPHERE: +3.00
OD_AXIS: 076
OD_OVR_VA: 20/
OD_SPHERE: +3.50
OD_CYLINDER: -0.75

## 2023-07-12 ASSESSMENT — SPHEQUIV_DERIVED
OD_SPHEQUIV: 3.875
OS_SPHEQUIV: 3.375
OS_SPHEQUIV: 3.5
OD_SPHEQUIV: 3.875

## 2023-07-12 ASSESSMENT — VISUAL ACUITY
OD_BCVA: 20/30-1
OS_BCVA: 20/30-2

## 2023-07-12 ASSESSMENT — AXIALLENGTH_DERIVED
OD_AL: 22.5441
OD_AL: 22.5441
OS_AL: 22.636
OS_AL: 22.6809

## 2023-07-12 ASSESSMENT — CONFRONTATIONAL VISUAL FIELD TEST (CVF)
OS_FINDINGS: FULL
OD_FINDINGS: FULL

## 2023-07-12 ASSESSMENT — TONOMETRY
OD_IOP_MMHG: 12
OS_IOP_MMHG: 14

## 2023-07-13 NOTE — PRE-OP CHECKLIST - NS PREOP CHK MONITOR ANESTHESIA CONSENT
done Closure 3 Information: This tab is for additional flaps and grafts above and beyond our usual structured repairs.  Please note if you enter information here it will not currently bill and you will need to add the billing information manually.

## 2023-08-30 NOTE — H&P PST ADULT - VENOUS THROMBOEMBOLISM FOR WOMEN ONLY
Reached out/sent message to Dr Kira Baeza to see what he thinks the patient should do until appointment.  Waiting on response (0) indicator not present

## 2023-09-12 ENCOUNTER — APPOINTMENT (OUTPATIENT)
Dept: ORTHOPEDIC SURGERY | Facility: CLINIC | Age: 68
End: 2023-09-12
Payer: MEDICARE

## 2023-09-12 VITALS — BODY MASS INDEX: 35.3 KG/M2 | HEIGHT: 61 IN | WEIGHT: 187 LBS

## 2023-09-12 DIAGNOSIS — M76.821 POSTERIOR TIBIAL TENDINITIS, RIGHT LEG: ICD-10-CM

## 2023-09-12 DIAGNOSIS — M19.071 PRIMARY OSTEOARTHRITIS, RIGHT ANKLE AND FOOT: ICD-10-CM

## 2023-09-12 DIAGNOSIS — I63.9 CEREBRAL INFARCTION, UNSPECIFIED: ICD-10-CM

## 2023-09-12 DIAGNOSIS — Z98.890 OTHER SPECIFIED POSTPROCEDURAL STATES: ICD-10-CM

## 2023-09-12 PROCEDURE — 73610 X-RAY EXAM OF ANKLE: CPT | Mod: RT

## 2023-09-12 PROCEDURE — 99214 OFFICE O/P EST MOD 30 MIN: CPT

## 2023-09-12 PROCEDURE — L1902: CPT | Mod: KX,RT

## 2024-07-15 ENCOUNTER — OFFICE (OUTPATIENT)
Dept: URBAN - METROPOLITAN AREA CLINIC 12 | Facility: CLINIC | Age: 69
Setting detail: OPHTHALMOLOGY
End: 2024-07-15
Payer: MEDICARE

## 2024-07-15 DIAGNOSIS — H52.4: ICD-10-CM

## 2024-07-15 DIAGNOSIS — I63.50: ICD-10-CM

## 2024-07-15 PROCEDURE — 92015 DETERMINE REFRACTIVE STATE: CPT | Performed by: STUDENT IN AN ORGANIZED HEALTH CARE EDUCATION/TRAINING PROGRAM

## 2024-07-15 PROCEDURE — 92083 EXTENDED VISUAL FIELD XM: CPT | Performed by: STUDENT IN AN ORGANIZED HEALTH CARE EDUCATION/TRAINING PROGRAM

## 2024-07-15 PROCEDURE — 92014 COMPRE OPH EXAM EST PT 1/>: CPT | Performed by: STUDENT IN AN ORGANIZED HEALTH CARE EDUCATION/TRAINING PROGRAM

## 2024-07-15 ASSESSMENT — CONFRONTATIONAL VISUAL FIELD TEST (CVF)
OD_FINDINGS: FULL
OS_FINDINGS: FULL

## 2024-11-21 ASSESSMENT — REFRACTION_MANIFEST
OS_SPHERE: +4.00
OS_AXIS: 100
OD_SPHERE: +4.00
OS_CYLINDER: -0.75
OD_ADD: +3.00
OD_VA1: 20/25
OD_AXIS: 090
OS_ADD: +3.00
OD_CYLINDER: -0.75
OS_VA1: 20/25

## 2024-11-21 ASSESSMENT — REFRACTION_CURRENTRX
OS_AXIS: 089
OD_SPHERE: +3.50
OD_ADD: +2.25
OS_OVR_VA: 20/
OD_VPRISM_DIRECTION: PROGS
OD_ADD: +3.00
OS_CYLINDER: -0.75
OD_OVR_VA: 20/
OD_AXIS: 076
OD_AXIS: 100
OD_CYLINDER: -0.75
OS_SPHERE: +3.00
OS_VPRISM_DIRECTION: PROGS
OD_SPHERE: +4.00
OD_CYLINDER: -0.25
OS_SPHERE: +4.25
OS_AXIS: 86
OS_ADD: +2.00
OS_CYLINDER: -0.75
OS_OVR_VA: 20/
OS_ADD: +3.00
OD_OVR_VA: 20/

## 2024-11-21 ASSESSMENT — KERATOMETRY
OS_K1POWER_DIOPTERS: 42.50
OS_K2POWER_DIOPTERS: 42.75
OD_K1POWER_DIOPTERS: 43.00
OD_AXISANGLE_DEGREES: 090
OS_AXISANGLE_DEGREES: 027
OD_K2POWER_DIOPTERS: 43.00
METHOD_AUTO_MANUAL: AUTO

## 2025-07-21 ENCOUNTER — OFFICE (OUTPATIENT)
Dept: URBAN - METROPOLITAN AREA CLINIC 12 | Facility: CLINIC | Age: 70
Setting detail: OPHTHALMOLOGY
End: 2025-07-21
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
## 2025-07-21 PROCEDURE — NO SHOW FE NO SHOW FEE: Performed by: STUDENT IN AN ORGANIZED HEALTH CARE EDUCATION/TRAINING PROGRAM
